# Patient Record
Sex: MALE | Race: BLACK OR AFRICAN AMERICAN | Employment: FULL TIME | URBAN - METROPOLITAN AREA
[De-identification: names, ages, dates, MRNs, and addresses within clinical notes are randomized per-mention and may not be internally consistent; named-entity substitution may affect disease eponyms.]

---

## 2019-06-04 ENCOUNTER — HOSPITAL ENCOUNTER (EMERGENCY)
Age: 39
Discharge: HOME | End: 2019-06-04
Attending: EMERGENCY MEDICINE

## 2019-06-04 VITALS
BODY MASS INDEX: 25.18 KG/M2 | HEART RATE: 53 BPM | RESPIRATION RATE: 20 BRPM | HEIGHT: 69 IN | OXYGEN SATURATION: 100 % | TEMPERATURE: 97.5 F | WEIGHT: 170 LBS | SYSTOLIC BLOOD PRESSURE: 157 MMHG | DIASTOLIC BLOOD PRESSURE: 85 MMHG

## 2019-06-04 DIAGNOSIS — K02.9 DENTAL CARIES: Primary | ICD-10-CM

## 2019-06-04 PROCEDURE — 6370000000 HC RX 637 (ALT 250 FOR IP): Performed by: EMERGENCY MEDICINE

## 2019-06-04 PROCEDURE — 99282 EMERGENCY DEPT VISIT SF MDM: CPT

## 2019-06-04 RX ORDER — PENICILLIN V POTASSIUM 250 MG/1
500 TABLET ORAL ONCE
Status: COMPLETED | OUTPATIENT
Start: 2019-06-04 | End: 2019-06-04

## 2019-06-04 RX ORDER — IBUPROFEN 800 MG/1
800 TABLET ORAL ONCE
Status: COMPLETED | OUTPATIENT
Start: 2019-06-04 | End: 2019-06-04

## 2019-06-04 RX ORDER — IBUPROFEN 800 MG/1
800 TABLET ORAL EVERY 8 HOURS PRN
Qty: 21 TABLET | Refills: 0 | Status: SHIPPED | OUTPATIENT
Start: 2019-06-04 | End: 2022-02-28

## 2019-06-04 RX ORDER — PENICILLIN V POTASSIUM 500 MG/1
500 TABLET ORAL 4 TIMES DAILY
Qty: 28 TABLET | Refills: 0 | Status: SHIPPED | OUTPATIENT
Start: 2019-06-04 | End: 2019-06-11

## 2019-06-04 RX ADMIN — PENICILLIN V POTASSIUM 500 MG: 250 TABLET ORAL at 00:39

## 2019-06-04 RX ADMIN — IBUPROFEN 800 MG: 800 TABLET, FILM COATED ORAL at 00:39

## 2019-06-04 ASSESSMENT — ENCOUNTER SYMPTOMS
EYE PAIN: 0
RHINORRHEA: 0
SORE THROAT: 0
NAUSEA: 0
VOMITING: 0
ABDOMINAL PAIN: 0
SHORTNESS OF BREATH: 0
COLOR CHANGE: 0
COUGH: 0

## 2019-06-04 ASSESSMENT — PAIN SCALES - GENERAL
PAINLEVEL_OUTOF10: 8
PAINLEVEL_OUTOF10: 7

## 2019-06-04 ASSESSMENT — PAIN DESCRIPTION - PROGRESSION: CLINICAL_PROGRESSION: GRADUALLY WORSENING

## 2019-06-04 ASSESSMENT — PAIN DESCRIPTION - ONSET: ONSET: PROGRESSIVE

## 2019-06-04 ASSESSMENT — PAIN DESCRIPTION - LOCATION: LOCATION: TEETH

## 2019-06-04 ASSESSMENT — PAIN DESCRIPTION - FREQUENCY: FREQUENCY: CONTINUOUS

## 2019-06-04 ASSESSMENT — PAIN DESCRIPTION - DESCRIPTORS: DESCRIPTORS: ACHING

## 2019-06-04 ASSESSMENT — PAIN DESCRIPTION - PAIN TYPE: TYPE: ACUTE PAIN

## 2019-06-04 NOTE — ED PROVIDER NOTES
[facility]  ED  Emergency Department Encounter  EmergencyMedicine Resident     Pt Name:Mohsen Elena  MRN: [id number]  Birthdate 1980  Date of evaluation: 6/4/19  PCP:  No primary care provider on file. CHIEF COMPLAINT       Chief Complaint   Patient presents with    Dental Pain       HISTORY OF PRESENT ILLNESS  (Location/Symptom, Timing/Onset, Context/Setting, Quality, Duration, Modifying Factors, Severity.)      Mandie Miles is a 37 y.o. male who presents with chief complaint of dental pain. States the pain is been ongoing for the past few days. Denies any trauma to his head. Has had teeth pulled out in the past.  Denies any throat pain or difficulty with swallowing. Denies any fever, chills, nausea or vomiting. Denies any other constitutional complaints. PAST MEDICAL / SURGICAL / SOCIAL / FAMILY HISTORY      has no past medical history on file. has no past surgical history on file.     Social History     Socioeconomic History    Marital status: Single     Spouse name: Not on file    Number of children: Not on file    Years of education: Not on file    Highest education level: Not on file   Occupational History    Not on file   Social Needs    Financial resource strain: Not on file    Food insecurity:     Worry: Not on file     Inability: Not on file    Transportation needs:     Medical: Not on file     Non-medical: Not on file   Tobacco Use    Smoking status: Current Every Day Smoker     Types: Cigarettes    Smokeless tobacco: Current User   Substance and Sexual Activity    Alcohol use: Not on file    Drug use: Yes     Types: Marijuana    Sexual activity: Not on file   Lifestyle    Physical activity:     Days per week: Not on file     Minutes per session: Not on file    Stress: Not on file   Relationships    Social connections:     Talks on phone: Not on file     Gets together: Not on file     Attends Nondenominational service: Not on file     Active member of club or organization: Not on file     Attends meetings of clubs or organizations: Not on file     Relationship status: Not on file    Intimate partner violence:     Fear of current or ex partner: Not on file     Emotionally abused: Not on file     Physically abused: Not on file     Forced sexual activity: Not on file   Other Topics Concern    Not on file   Social History Narrative    Not on file       History reviewed. No pertinent family history. Allergies:  Patient has no known allergies. Home Medications:  Prior to Admission medications    Medication Sig Start Date End Date Taking? Authorizing Provider   penicillin v potassium (VEETID) 500 MG tablet Take 1 tablet by mouth 4 times daily for 7 days 6/4/19 6/11/19 Yes Jillian Bill MD   ibuprofen (IBU) 800 MG tablet Take 1 tablet by mouth every 8 hours as needed for Pain 6/4/19  Yes Jordan Valle MD       REVIEW OF SYSTEMS    (2-9 systems for level 4, 10 or more for level 5)      Review of Systems   Constitutional: Negative for chills and fever. HENT: Positive for dental problem. Negative for rhinorrhea and sore throat. Eyes: Negative for pain and visual disturbance. Respiratory: Negative for cough and shortness of breath. Cardiovascular: Negative for chest pain and palpitations. Gastrointestinal: Negative for abdominal pain, nausea and vomiting. Genitourinary: Negative for difficulty urinating and dysuria. Musculoskeletal: Negative for arthralgias and myalgias. Skin: Negative for color change and wound. Neurological: Negative for weakness, numbness and headaches. Psychiatric/Behavioral: Negative for behavioral problems and dysphoric mood.        PHYSICAL EXAM   (up to 7 for level 4, 8 or more for level 5)      INITIAL VITALS:   BP (!) 157/85   Pulse 53   Temp 97.5 °F (36.4 °C) (Oral)   Resp 20   Ht 5' 9\" (1.753 m)   Wt 170 lb (77.1 kg)   SpO2 100%   BMI 25.10 kg/m²     Physical Exam   Constitutional: He is oriented to follow-up with dentist.    Sigifredo Carcamo:  None    EKG  None    All EKG's are interpreted by the Emergency Department Physician who either signs or Co-signs this chart in the absence of a cardiologist.    EMERGENCY DEPARTMENT COURSE:  Patient advised to take medications as prescribed. Provided with a dental clinic list.  Discussed if symptoms worsen, to return to the ED. Patient agreeable with the plan, stable for discharge. PROCEDURES:  None    CONSULTS:  None    CRITICAL CARE:  None    FINAL IMPRESSION      1. Dental caries          DISPOSITION / PLAN     DISPOSITION Decision To Discharge 06/04/2019 12:32:31 AM      PATIENT REFERRED TO:  No follow-up provider specified.     DISCHARGE MEDICATIONS:  New Prescriptions    IBUPROFEN (IBU) 800 MG TABLET    Take 1 tablet by mouth every 8 hours as needed for Pain    PENICILLIN V POTASSIUM (VEETID) 500 MG TABLET    Take 1 tablet by mouth 4 times daily for 7 days       Adithya Chávez MD  Emergency Medicine Resident    (Please note that portions of thisnote were completed with a voice recognition program.  Efforts were made to edit the dictations but occasionally words are mis-transcribed.)       Devyn Roldan MD  Resident  06/04/19 [id number]

## 2019-06-04 NOTE — ED PROVIDER NOTES
[facility]     Emergency Department     Faculty Attestation    I performed a history and physical examination of the patient and discussed management with the resident. I reviewed the residents note and agree with the documented findings and plan of care. Any areas of disagreement are noted on the chart. I was personally present for the key portions of any procedures. I have documented in the chart those procedures where I was not present during the key portions. I have reviewed the emergency nurses triage note. I agree with the chief complaint, past medical history, past surgical history, allergies, medications, social and family history as documented unless otherwise noted below. Documentation of the HPI, Physical Exam and Medical Decision Making performed by medical students or scribes is based on my personal performance of the HPI, PE and MDM. For Physician Assistant/ Nurse Practitioner cases/documentation I have personally evaluated this patient and have completed at least one if not all key elements of the E/M (history, physical exam, and MDM). Additional findings are as noted. Primary Care Physician: No primary care provider on file. History: This is a 37 y.o. male who presents to the Emergency Department with complaint of Dental pain. Is been ongoing for last 3 days. Patient denies any fever, chills or sweats. The patient denies any difficulty swallowing or shortness of breath    Physical:   height is 5' 9\" (1.753 m) and weight is 170 lb (77.1 kg). His oral temperature is 97.5 °F (36.4 °C). His blood pressure is 157/85 (abnormal) and his pulse is 53. His respiration is 20 and oxygen saturation is 100%. The patient has multiple dental caries noted. There is no tongue elevation noted. The patient has no abscess. Airways patent there is no pooling of oral secretions.      Impression: Dental caries    Plan: Antibiotics, analgesia and dentist follow-up    Terence Alejo MD  Attending Emergency  Physician        Tony Watkins MD  06/04/19 [id number]

## 2019-07-16 ENCOUNTER — HOSPITAL ENCOUNTER (EMERGENCY)
Age: 39
Discharge: HOME | End: 2019-07-16
Attending: EMERGENCY MEDICINE

## 2019-07-16 VITALS
TEMPERATURE: 97.7 F | SYSTOLIC BLOOD PRESSURE: 144 MMHG | DIASTOLIC BLOOD PRESSURE: 82 MMHG | HEART RATE: 59 BPM | OXYGEN SATURATION: 100 % | RESPIRATION RATE: 18 BRPM

## 2019-07-16 DIAGNOSIS — R30.0 DYSURIA: Primary | ICD-10-CM

## 2019-07-16 PROCEDURE — 6360000002 HC RX W HCPCS: Performed by: PHYSICIAN ASSISTANT

## 2019-07-16 PROCEDURE — 96372 THER/PROPH/DIAG INJ SC/IM: CPT

## 2019-07-16 PROCEDURE — 87491 CHLMYD TRACH DNA AMP PROBE: CPT

## 2019-07-16 PROCEDURE — 99283 EMERGENCY DEPT VISIT LOW MDM: CPT

## 2019-07-16 PROCEDURE — 6370000000 HC RX 637 (ALT 250 FOR IP): Performed by: PHYSICIAN ASSISTANT

## 2019-07-16 PROCEDURE — 87591 N.GONORRHOEAE DNA AMP PROB: CPT

## 2019-07-16 RX ORDER — CEFTRIAXONE SODIUM 250 MG/1
250 INJECTION, POWDER, FOR SOLUTION INTRAMUSCULAR; INTRAVENOUS ONCE
Status: COMPLETED | OUTPATIENT
Start: 2019-07-16 | End: 2019-07-16

## 2019-07-16 RX ORDER — AZITHROMYCIN 250 MG/1
1000 TABLET, FILM COATED ORAL ONCE
Status: COMPLETED | OUTPATIENT
Start: 2019-07-16 | End: 2019-07-16

## 2019-07-16 RX ADMIN — CEFTRIAXONE SODIUM 250 MG: 250 INJECTION, POWDER, FOR SOLUTION INTRAMUSCULAR; INTRAVENOUS at 11:55

## 2019-07-16 RX ADMIN — AZITHROMYCIN 1000 MG: 250 TABLET, FILM COATED ORAL at 11:55

## 2019-07-16 ASSESSMENT — ENCOUNTER SYMPTOMS
WHEEZING: 0
COUGH: 0
VOMITING: 0
NAUSEA: 0
ABDOMINAL PAIN: 0
COLOR CHANGE: 0

## 2019-07-17 LAB
C. TRACHOMATIS DNA ,URINE: NEGATIVE
N. GONORRHOEAE DNA, URINE: NEGATIVE
SPECIMEN DESCRIPTION: NORMAL

## 2019-07-28 ENCOUNTER — HOSPITAL ENCOUNTER (EMERGENCY)
Age: 39
Discharge: HOME | End: 2019-07-28
Attending: EMERGENCY MEDICINE

## 2019-07-28 ENCOUNTER — APPOINTMENT (OUTPATIENT)
Dept: GENERAL RADIOLOGY | Age: 39
End: 2019-07-28

## 2019-07-28 VITALS
BODY MASS INDEX: 23.63 KG/M2 | HEART RATE: 64 BPM | DIASTOLIC BLOOD PRESSURE: 83 MMHG | TEMPERATURE: 98.1 F | OXYGEN SATURATION: 98 % | SYSTOLIC BLOOD PRESSURE: 146 MMHG | WEIGHT: 160 LBS | RESPIRATION RATE: 18 BRPM

## 2019-07-28 DIAGNOSIS — M70.21 OLECRANON BURSITIS OF RIGHT ELBOW: Primary | ICD-10-CM

## 2019-07-28 PROCEDURE — 73080 X-RAY EXAM OF ELBOW: CPT

## 2019-07-28 PROCEDURE — 6360000002 HC RX W HCPCS: Performed by: GENERAL PRACTICE

## 2019-07-28 PROCEDURE — 90715 TDAP VACCINE 7 YRS/> IM: CPT | Performed by: GENERAL PRACTICE

## 2019-07-28 PROCEDURE — 99283 EMERGENCY DEPT VISIT LOW MDM: CPT

## 2019-07-28 PROCEDURE — 90471 IMMUNIZATION ADMIN: CPT | Performed by: GENERAL PRACTICE

## 2019-07-28 RX ADMIN — TETANUS TOXOID, REDUCED DIPHTHERIA TOXOID AND ACELLULAR PERTUSSIS VACCINE, ADSORBED 0.5 ML: 5; 2.5; 8; 8; 2.5 SUSPENSION INTRAMUSCULAR at 15:53

## 2019-07-28 ASSESSMENT — PAIN DESCRIPTION - ORIENTATION: ORIENTATION: RIGHT

## 2019-07-28 ASSESSMENT — ENCOUNTER SYMPTOMS
SHORTNESS OF BREATH: 0
ABDOMINAL PAIN: 0
NAUSEA: 0
VOMITING: 0
COUGH: 0

## 2019-07-28 ASSESSMENT — PAIN DESCRIPTION - LOCATION: LOCATION: ELBOW

## 2019-07-28 ASSESSMENT — PAIN DESCRIPTION - PAIN TYPE: TYPE: ACUTE PAIN

## 2019-07-28 ASSESSMENT — PAIN DESCRIPTION - DESCRIPTORS: DESCRIPTORS: ACHING

## 2019-07-28 ASSESSMENT — PAIN SCALES - GENERAL: PAINLEVEL_OUTOF10: 8

## 2019-07-28 NOTE — ED NOTES
Pt presents to the ER with complaints of right elbow pain from falling into a hole at work. He denies any LOC. Pt has 2 small lacerations on his right lower leg. He states he is having some mild elbow pain. VSS.       Lesli Kuo RN  07/28/19 [id number]

## 2021-05-16 ENCOUNTER — HOSPITAL ENCOUNTER (EMERGENCY)
Age: 41
Discharge: HOME | End: 2021-05-16
Attending: EMERGENCY MEDICINE
Payer: COMMERCIAL

## 2021-05-16 VITALS
TEMPERATURE: 97 F | OXYGEN SATURATION: 100 % | DIASTOLIC BLOOD PRESSURE: 71 MMHG | HEIGHT: 69 IN | BODY MASS INDEX: 23.7 KG/M2 | HEART RATE: 63 BPM | RESPIRATION RATE: 16 BRPM | WEIGHT: 160 LBS | SYSTOLIC BLOOD PRESSURE: 114 MMHG

## 2021-05-16 DIAGNOSIS — M79.631 PAIN AND SWELLING OF RIGHT FOREARM: Primary | ICD-10-CM

## 2021-05-16 DIAGNOSIS — M79.89 PAIN AND SWELLING OF RIGHT FOREARM: Primary | ICD-10-CM

## 2021-05-16 PROCEDURE — 99282 EMERGENCY DEPT VISIT SF MDM: CPT

## 2021-05-16 ASSESSMENT — ENCOUNTER SYMPTOMS
BACK PAIN: 0
FACIAL SWELLING: 0
SHORTNESS OF BREATH: 0
ABDOMINAL PAIN: 0

## 2021-05-16 NOTE — ED PROVIDER NOTES
[facility]  ED  Emergency Department Encounter  Emergency Medicine Resident     Pt Name: Ayla Flaherty  MRN: [id number]  [place] 1980  Date of evaluation: 5/16/21  PCP:  No primary care provider on file. CHIEF COMPLAINT       Chief Complaint   Patient presents with    Arm Swelling     right arm had laceration repair on tuesday, pt reports he is taking antibiotics but now right arm is swollen       HISTORY OFPRESENT ILLNESS  (Location/Symptom, Timing/Onset, Context/Setting, Quality, Duration, Modifying Factors,Severity.)      Ayla Flaherty is a 35 y. o.yo male who presents with R arm swelling. Patient here with right arm swelling, was seen in the emergency department a week ago for laceration, laceration was sustained with injury by a chainsaw, was sutured here and seen by trauma and discharged home with Keflex, states that he has not noticed any worsening pain but today there is some residual swelling distally to the right arm, no neurovascular compromise, range of motion intact, there is mild swelling on the forearm but patient states no fevers no chills, no redness no drainage or or purulent material seen at the site of the laceration. Denies any recurrent trauma or reinjury of that arm. Denies any weakness in  or loss of strength. PAST MEDICAL / SURGICAL / SOCIAL / FAMILY HISTORY      has no past medical history on file. has no past surgical history on file.      Social History     Socioeconomic History    Marital status: Single     Spouse name: Not on file    Number of children: Not on file    Years of education: Not on file    Highest education level: Not on file   Occupational History    Not on file   Tobacco Use    Smoking status: Current Every Day Smoker     Types: Cigarettes    Smokeless tobacco: Current User   Substance and Sexual Activity    Alcohol use: Not on file    Drug use: Yes     Types: Marijuana    Sexual activity: Not on file   Other Topics Concern    Not on file   Social History Narrative    Not on file     Social Determinants of Health     Financial Resource Strain:     Difficulty of Paying Living Expenses:    Food Insecurity:     Worried About Running Out of Food in the Last Year:     [address] in the Last Year:    Transportation Needs:     Lack of Transportation (Medical):  Lack of Transportation (Non-Medical):    Physical Activity:     Days of Exercise per Week:     Minutes of Exercise per Session:    Stress:     Feeling of Stress :    Social Connections:     Frequency of Communication with Friends and Family:     Frequency of Social Gatherings with Friends and Family:     Attends Hindu Services:     Active Member of Clubs or Organizations:     Attends Club or Organization Meetings:     Marital Status:    Intimate Partner Violence:     Fear of Current or Ex-Partner:     Emotionally Abused:     Physically Abused:     Sexually Abused:        No family history on file. Allergies:  Patient has no known allergies. Home Medications:  Prior to Admission medications    Medication Sig Start Date End Date Taking? Authorizing Provider   METFORMIN HCL PO Take by mouth    Historical Provider, MD   ibuprofen (IBU) 800 MG tablet Take 1 tablet by mouth every 8 hours as needed for Pain 6/4/19   Ashley Anderson MD       REVIEW OFSYSTEMS    (2-9 systems for level 4, 10 or more for level 5)      Review of Systems   Constitutional: Negative for fever. HENT: Negative for facial swelling. Eyes: Negative for visual disturbance. Respiratory: Negative for shortness of breath. Cardiovascular: Negative for chest pain. Gastrointestinal: Negative for abdominal pain. Genitourinary: Negative for flank pain. Musculoskeletal: Negative for back pain. Skin: Positive for wound. Neurological: Negative for headaches. Psychiatric/Behavioral: Negative for confusion.        PHYSICAL EXAM   (up to 7 for level 4, 8 or more forlevel 5)      ED TRIAGE VITALS BP: 114/71, Temp: 97 °F (36.1 °C), Pulse: 63, Resp: 16, SpO2: 100 %    Vitals:    05/16/21 1140   BP: 114/71   Pulse: 63   Resp: 16   Temp: 97 °F (36.1 °C)   TempSrc: Tympanic   SpO2: 100%   Weight: 160 lb (72.6 kg)   Height: 5' 9\" (1.753 m)       Physical Exam  HENT:      Head: Normocephalic. Nose: Nose normal.   Eyes:      Extraocular Movements: Extraocular movements intact. Cardiovascular:      Rate and Rhythm: Normal rate. Musculoskeletal:         General: Swelling present. Right forearm: Swelling present. Arms:       Cervical back: Normal range of motion. Neurological:      Mental Status: He is alert. DIFFERENTIAL  DIAGNOSIS     PLAN (LABS / IMAGING / EKG):  No orders of the defined types were placed in this encounter. MEDICATIONS ORDERED:  No orders of the defined types were placed in this encounter. DDX:     Swelling of the right arm, infection, compartment syndrome,    Initial MDM/Plan: 35 y.o. male who presents with swelling R arm. Swelling-compartment/infection: On exam there is mild swelling, no neurovascular compromise, no pain with passive movements, no fevers or chills, area of laceration appears well, intact clean dry-has an appointment to follow-up with surgeon tomorrow. DIAGNOSTIC RESULTS / EMERGENCYDEPARTMENT COURSE / MDM     LABS:  No results found for this visit on 05/16/21. RADIOLOGY:  No orders to display         EMERGENCY DEPARTMENT COURSE:  ED Course as of May 16 1226   Sun May 16, 2021   1154 Patient seen and assessed in the emergency department no acute respiratory or cardiovascular distress.   Patient here with right arm swelling, was seen in the emergency department a week ago for laceration, laceration was sustained with injury by a chainsaw, was sutured here and seen by trauma and discharged home with Keflex, states that he has not noticed any worsening pain but today there is some residual swelling distally to the right arm, no neurovascular compromise, range of motion intact, there is mild swelling on the forearm but patient states no fevers no chills, no redness no drainage or or purulent material seen at the site of the laceration. Denies any recurrent trauma or reinjury of that arm. Denies any weakness in  or loss of strength. [PS]      ED Course User Index  [PS] Lexus Murdock MD          PROCEDURES:  None    CONSULTS:  None    CRITICAL CARE:  Please see attending note    FINAL IMPRESSION      1.  Pain and swelling of right forearm          DISPOSITION / PLAN     DISPOSITION Decision To Discharge 05/16/2021 11:55:39 AM       PATIENT REFERRED TO:  [facility]  [address] [facility]  [phone]  In 1 day      [facility]  [address] [address]  [phone]    As needed, If symptoms worsen      DISCHARGE MEDICATIONS:  New Prescriptions    No medications on file       Lexus Murdock MD  Emergency Medicine Resident    (Please note that portions of this note were completed with a voice recognition program.Efforts were made to edit the dictations but occasionally words are mis-transcribed.)       Lexus Murdock MD  Resident  05/16/21 9347

## 2021-05-16 NOTE — ED PROVIDER NOTES
[facility]     Emergency Department     Faculty Note/ Attestation      Pt Name: Daja Marks                                       MRN: [id number]  [place] 1980  Date of evaluation: 5/16/2021    Patients PCP:    No primary care provider on file. Attestation  I performed a history and physical examination of the patient and discussed management with the resident. I reviewed the residents note and agree with the documented findings and plan of care. Any areas of disagreement are noted on the chart. I was personally present for the key portions of any procedures. I have documented in the chart those procedures where I was not present during the key portions. I have reviewed the emergency nurses triage note. I agree with the chief complaint, past medical history, past surgical history, allergies, medications, social and family history as documented unless otherwise noted below. For Physician Assistant/ Nurse Practitioner cases/documentation I have personally evaluated this patient and have completed at least one if not all key elements of the E/M (history, physical exam, and MDM). Additional findings are as noted. Initial Screens:             Vitals:    Vitals:    05/16/21 1140   BP: 114/71   Pulse: 63   Resp: 16   Temp: 97 °F (36.1 °C)   TempSrc: Tympanic   SpO2: 100%   Weight: 160 lb (72.6 kg)   Height: 5' 9\" (1.753 m)       CHIEF COMPLAINT       Chief Complaint   Patient presents with    Arm Swelling     right arm had laceration repair on tuesday, pt reports he is taking antibiotics but now right arm is swollen       The pt is a 34 YO M       EMERGENCY DEPARTMENT COURSE:     -------------------------  BP: 114/71, Temp: 97 °F (36.1 °C), Pulse: 63, Resp: 16  Physical Exam  Constitutional:       Appearance: He is well-developed. He is not diaphoretic. HENT:      Head: Normocephalic and atraumatic.       Right Ear: External ear normal.      Left Ear: External ear normal. Eyes:      General: No scleral icterus. Right eye: No discharge. Left eye: No discharge. Neck:      Trachea: No tracheal deviation. Pulmonary:      Effort: Pulmonary effort is normal. No respiratory distress. Breath sounds: No stridor. Musculoskeletal:         General: Normal range of motion. Cervical back: Normal range of motion. Comments: Healing wound to the right elbow region no passive pain on range of motion   Skin:     General: Skin is warm and dry. Neurological:      Mental Status: He is alert and oriented to person, place, and time. Coordination: Coordination normal.   Psychiatric:         Behavior: Behavior normal.           Comments  No signs of infection patient has good follow-up for tomorrow can be safely discharged    ED Course as of May 16 1239   Sun May 16, 2021   1154 Patient seen and assessed in the emergency department no acute respiratory or cardiovascular distress. Patient here with right arm swelling, was seen in the emergency department a week ago for laceration, laceration was sustained with injury by a chainsaw, was sutured here and seen by trauma and discharged home with Keflex, states that he has not noticed any worsening pain but today there is some residual swelling distally to the right arm, no neurovascular compromise, range of motion intact, there is mild swelling on the forearm but patient states no fevers no chills, no redness no drainage or or purulent material seen at the site of the laceration. Denies any recurrent trauma or reinjury of that arm. Denies any weakness in  or loss of strength. [PS]      ED Course User Index  [PS] MD Amelia Garcia DO,, DO, RDMS.   Attending Emergency Physician         Amelia Goins DO  05/16/21 1528

## 2021-10-25 ENCOUNTER — HOSPITAL ENCOUNTER (EMERGENCY)
Age: 41
Discharge: HOME | End: 2021-10-25
Attending: EMERGENCY MEDICINE
Payer: COMMERCIAL

## 2021-10-25 VITALS
HEIGHT: 69 IN | OXYGEN SATURATION: 100 % | DIASTOLIC BLOOD PRESSURE: 91 MMHG | RESPIRATION RATE: 18 BRPM | SYSTOLIC BLOOD PRESSURE: 136 MMHG | TEMPERATURE: 97.2 F | BODY MASS INDEX: 24.44 KG/M2 | WEIGHT: 165 LBS | HEART RATE: 88 BPM

## 2021-10-25 DIAGNOSIS — N34.2 URETHRITIS: Primary | ICD-10-CM

## 2021-10-25 PROCEDURE — 6360000002 HC RX W HCPCS: Performed by: STUDENT IN AN ORGANIZED HEALTH CARE EDUCATION/TRAINING PROGRAM

## 2021-10-25 PROCEDURE — 99283 EMERGENCY DEPT VISIT LOW MDM: CPT

## 2021-10-25 PROCEDURE — 6370000000 HC RX 637 (ALT 250 FOR IP): Performed by: STUDENT IN AN ORGANIZED HEALTH CARE EDUCATION/TRAINING PROGRAM

## 2021-10-25 PROCEDURE — 87491 CHLMYD TRACH DNA AMP PROBE: CPT

## 2021-10-25 PROCEDURE — 96372 THER/PROPH/DIAG INJ SC/IM: CPT

## 2021-10-25 PROCEDURE — 87591 N.GONORRHOEAE DNA AMP PROB: CPT

## 2021-10-25 RX ORDER — DOXYCYCLINE HYCLATE 100 MG
100 TABLET ORAL ONCE
Status: COMPLETED | OUTPATIENT
Start: 2021-10-25 | End: 2021-10-25

## 2021-10-25 RX ORDER — CEFTRIAXONE SODIUM 250 MG/1
250 INJECTION, POWDER, FOR SOLUTION INTRAMUSCULAR; INTRAVENOUS ONCE
Status: DISCONTINUED | OUTPATIENT
Start: 2021-10-25 | End: 2021-10-25

## 2021-10-25 RX ORDER — DOXYCYCLINE HYCLATE 100 MG
100 TABLET ORAL 2 TIMES DAILY
Qty: 13 TABLET | Refills: 0 | Status: SHIPPED | OUTPATIENT
Start: 2021-10-25 | End: 2021-11-01

## 2021-10-25 RX ORDER — CEFTRIAXONE 500 MG/1
500 INJECTION, POWDER, FOR SOLUTION INTRAMUSCULAR; INTRAVENOUS ONCE
Status: COMPLETED | OUTPATIENT
Start: 2021-10-25 | End: 2021-10-25

## 2021-10-25 RX ADMIN — CEFTRIAXONE SODIUM 500 MG: 500 INJECTION, POWDER, FOR SOLUTION INTRAMUSCULAR; INTRAVENOUS at 19:47

## 2021-10-25 RX ADMIN — DOXYCYCLINE HYCLATE 100 MG: 100 TABLET, COATED ORAL at 19:46

## 2021-10-25 ASSESSMENT — ENCOUNTER SYMPTOMS
DIARRHEA: 0
SHORTNESS OF BREATH: 0
NAUSEA: 0
ABDOMINAL PAIN: 0
VOMITING: 0

## 2021-10-25 NOTE — ED PROVIDER NOTES
[facility]  ED  Emergency Department Encounter  EmergencyMedicine Resident     Pt Name:Mohsen Guerrero  MRN: [id number]  Birthdate 1980  Date of evaluation: 10/25/21  PCP:  No primary care provider on file. This patient was evaluated in the Emergency Department for symptoms described in the history of present illness. The patient was evaluated in the context of the global COVID-19 pandemic, which necessitated consideration that the patient might be at risk for infection with the SARS-CoV-2 virus that causes COVID-19. Institutional protocols and algorithms that pertain to the evaluation of patients at risk for COVID-19 are in a state of rapid change based on information released by regulatory bodies including the CDC and federal and state organizations. These policies and algorithms were followed during the patient's care in the ED. CHIEF COMPLAINT       Chief Complaint   Patient presents with    Exposure to STD    Penile Discharge       HISTORY OF PRESENT ILLNESS  (Location/Symptom, Timing/Onset, Context/Setting, Quality, Duration, Modifying Factors, Severity.)      Maykel Valenzuela is a 35 y.o. male who presents with complaint of exposure to STD as well as penile discharge. Noticed purulent discharge from his penis this morning. Has had dysuria for the past couple days. Has had unprotected sex with partner. States he only has 1 partner is unsure of what his partner is doing. Denies any hematuria, back pain, abdominal pain, nausea, vomiting, diarrhea, fever, chills, chest pain, shortness of breath. Is a diabetic with long-acting insulin as well as Metformin. States that sugars are well controlled. PAST MEDICAL / SURGICAL / SOCIAL / FAMILY HISTORY      has no past medical history on file. has no past surgical history on file.       Social History     Socioeconomic History    Marital status: Single     Spouse name: Not on file    Number of children: Not on file    Years of education: Not on file    Highest education level: Not on file   Occupational History    Not on file   Tobacco Use    Smoking status: Current Every Day Smoker     Types: Cigarettes    Smokeless tobacco: Current User   Substance and Sexual Activity    Alcohol use: Not on file    Drug use: Yes     Types: Marijuana    Sexual activity: Not on file   Other Topics Concern    Not on file   Social History Narrative    Not on file     Social Determinants of Health     Financial Resource Strain:     Difficulty of Paying Living Expenses:    Food Insecurity:     Worried About Running Out of Food in the Last Year:     [address] in the Last Year:    Transportation Needs:     Lack of Transportation (Medical):  Lack of Transportation (Non-Medical):    Physical Activity:     Days of Exercise per Week:     Minutes of Exercise per Session:    Stress:     Feeling of Stress :    Social Connections:     Frequency of Communication with Friends and Family:     Frequency of Social Gatherings with Friends and Family:     Attends [employer] Services:     Active Member of Clubs or Organizations:     Attends Club or Organization Meetings:     Marital Status:    Intimate Partner Violence:     Fear of Current or Ex-Partner:     Emotionally Abused:     Physically Abused:     Sexually Abused:        No family history on file. Allergies:  Patient has no known allergies. Home Medications:  Prior to Admission medications    Medication Sig Start Date End Date Taking?  Authorizing Provider   doxycycline hyclate (VIBRA-TABS) 100 MG tablet Take 1 tablet by mouth 2 times daily for 7 days 10/25/21 11/1/21 Yes Valora Mates, DO   METFORMIN HCL PO Take by mouth    Historical Provider, MD   ibuprofen (IBU) 800 MG tablet Take 1 tablet by mouth every 8 hours as needed for Pain 6/4/19   Juli Herrera MD       REVIEW OF SYSTEMS    (2-9 systems for level 4, 10 or more for level 5)      Review of Systems Constitutional: Negative for chills, fatigue and fever. Respiratory: Negative for shortness of breath. Cardiovascular: Negative for chest pain. Gastrointestinal: Negative for abdominal pain, diarrhea, nausea and vomiting. Genitourinary: Positive for discharge and dysuria. Negative for penile swelling, scrotal swelling, testicular pain and urgency. PHYSICAL EXAM   (up to 7 for level 4, 8 or more for level 5)      INITIAL VITALS:   BP (!) 136/91   Pulse 88   Temp 97.2 °F (36.2 °C) (Oral)   Resp 18   Ht 5' 9\" (1.753 m)   Wt 165 lb (74.8 kg)   SpO2 100%   BMI 24.37 kg/m²     Physical Exam  Constitutional:       General: He is not in acute distress. Appearance: He is not ill-appearing, toxic-appearing or diaphoretic. Comments: Well-appearing 70-year-old male, speaking full senses, nontoxic-appearing, none ill-appearing, no acute distress   Cardiovascular:      Rate and Rhythm: Normal rate and regular rhythm. Heart sounds: No murmur heard. No friction rub. No gallop. Pulmonary:      Effort: No respiratory distress. Breath sounds: No wheezing, rhonchi or rales. Chest:      Chest wall: No tenderness. Abdominal:      General: There is no distension. Tenderness: There is no abdominal tenderness. There is no right CVA tenderness, left CVA tenderness, guarding or rebound. Genitourinary:     Comments: Purulent penile discharge noted at the urethral meatus, penis soft nontender palpation with wartlike lesion that is raised proximally 0.5 cm. Testicles are nontender to palpation, no testicular lesions noted, no inguinal hernias noted. Skin:     General: Skin is warm and dry. Capillary Refill: Capillary refill takes less than 2 seconds. Neurological:      Mental Status: He is oriented to person, place, and time.          DIFFERENTIAL  DIAGNOSIS     PLAN (LABS / IMAGING / EKG):  Orders Placed This Encounter   Procedures    C.trachomatis N.gonorrhoeae DNA, Urine  PREVIOUS SPECIMEN    Urinalysis with Microscopic       MEDICATIONS ORDERED:  Orders Placed This Encounter   Medications    DISCONTD: cefTRIAXone (ROCEPHIN) injection 250 mg     Order Specific Question:   Antimicrobial Indications     Answer:   STD infection    doxycycline hyclate (VIBRA-TABS) tablet 100 mg     Order Specific Question:   Antimicrobial Indications     Answer:   STD infection    cefTRIAXone (ROCEPHIN) injection 500 mg     Order Specific Question:   Antimicrobial Indications     Answer:   STD infection    doxycycline hyclate (VIBRA-TABS) 100 MG tablet     Sig: Take 1 tablet by mouth 2 times daily for 7 days     Dispense:  13 tablet     Refill:  0       DDX: UTI, urethritis, pyelonephritis, epididymitis, torsion    DIAGNOSTIC RESULTS / EMERGENCY DEPARTMENT COURSE / MDM   LAB RESULTS:  No results found for this visit on 10/25/21. IMPRESSION: Urine sent for gonorrhea/chlamydia. Lab error resulted in urinalysis taking too long, spoke with patient about staying versus going. He chose to go as he is already being treated with antibiotics    EMERGENCY DEPARTMENT COURSE:  Well-appearing 24-year-old male presenting chief complaint of exposure to STD as well as penile discharge. Examination does show purulent drainage from the urethral meatus. Will treat empirically. No CVA tenderness as well as no testicular tenderness unlikely to be epididymitis, torsion or pyelonephritis. Will test for gonorrhea as well as chlamydia. Will obtain urinalysis and discharge home. Patient discharged home with doxycycline. Given strict return precautions as well as PCP follow-up. Patient was amenable to discharge. PROCEDURES:  n/a    CONSULTS:  None    CRITICAL CARE:  n/a    FINAL IMPRESSION      1. Urethritis          DISPOSITION / PLAN     DISPOSITION Decision To Discharge 10/25/2021 08:58:45 PM      PATIENT REFERRED TO:  No follow-up provider specified.     DISCHARGE MEDICATIONS:  Discharge Medication List as of 10/25/2021  9:01 PM      START taking these medications    Details   doxycycline hyclate (VIBRA-TABS) 100 MG tablet Take 1 tablet by mouth 2 times daily for 7 days, Disp-13 tablet, R-0Print             Rock Sophia DO  Emergency Medicine Resident    (Please note that portions of thisnote were completed with a voice recognition program.  Efforts were made to edit the dictations but occasionally words are mis-transcribed.)       Frankie Smiley DO  Resident  10/25/21 [id number]

## 2021-10-25 NOTE — ED PROVIDER NOTES
[facility] ED  Faculty Attestation    I performed a history and physical examination of the patient and discussed management with the resident. I reviewed the residents note and agree with the documented findings and plan of care. Any areas of disagreement are noted on the chart. I was personally present for the key portions of any procedures. I have documented in the chart those procedures where I was not present during the key portions. I have reviewed the emergency nurses triage note. I agree with the chief complaint, past medical history, past surgical history, allergies, medications, social, and family history as documented unless otherwise noted below.        This is a 78-year-old male who presents to the emergency department for evaluation of penile discharge  Onset was this morning  Notes 1 recent sexual partner  Patient does not always use barrier protection  No fever  No abdominal pain  No specific dysuria or other urinary changes  No genital discomfort  Review of systems otherwise negative  He has no additional complaints at this time    On examination he appears in no acute distress  No abdominal CVA tenderness  Genital exam performed by the resident    Rocephin IM and oral doxycycline ordered awaiting urine studies    Urinalysis ordered but unclear why the lab has not run the sample almost two hours post collection  Patient requesting discharge  Lab contacted with no answers provided      Michoacano Bowens DO  Attending Emergency Physician       Jared Mcleod DO  10/25/21 [id number]

## 2021-12-20 ENCOUNTER — HOSPITAL ENCOUNTER (EMERGENCY)
Age: 41
Discharge: HOME | End: 2021-12-20
Attending: EMERGENCY MEDICINE
Payer: COMMERCIAL

## 2021-12-20 VITALS
RESPIRATION RATE: 18 BRPM | OXYGEN SATURATION: 100 % | SYSTOLIC BLOOD PRESSURE: 144 MMHG | DIASTOLIC BLOOD PRESSURE: 84 MMHG | HEART RATE: 72 BPM | TEMPERATURE: 98.7 F

## 2021-12-20 DIAGNOSIS — H57.9 SENSATION OF FOREIGN BODY IN EYE: Primary | ICD-10-CM

## 2021-12-20 PROCEDURE — 99282 EMERGENCY DEPT VISIT SF MDM: CPT

## 2021-12-20 RX ORDER — PROPARACAINE HYDROCHLORIDE 5 MG/ML
SOLUTION/ DROPS OPHTHALMIC
Status: DISCONTINUED
Start: 2021-12-20 | End: 2021-12-20 | Stop reason: HOSPADM

## 2021-12-20 RX ORDER — DIPHENHYDRAMINE HCL 25 MG
1 CAPSULE ORAL EVERY 4 HOURS PRN
Qty: 1 EACH | Refills: 0 | Status: SHIPPED | OUTPATIENT
Start: 2021-12-20

## 2021-12-20 RX ORDER — PROPARACAINE HYDROCHLORIDE 5 MG/ML
2 SOLUTION/ DROPS OPHTHALMIC ONCE
Status: DISCONTINUED | OUTPATIENT
Start: 2021-12-20 | End: 2021-12-20 | Stop reason: HOSPADM

## 2021-12-20 NOTE — ED PROVIDER NOTES
[facility]     Emergency Department     Faculty Attestation    I performed a history and physical examination of the patient and discussed management with the resident. I reviewed the resident´s note and agree with the documented findings and plan of care. Any areas of disagreement are noted on the chart. I was personally present for the key portions of any procedures. I have documented in the chart those procedures where I was not present during the key portions. I have reviewed the emergency nurses triage note. I agree with the chief complaint, past medical history, past surgical history, allergies, medications, social and family history as documented unless otherwise noted below. For Physician Assistant/ Nurse Practitioner cases/documentation I have personally evaluated this patient and have completed at least one if not all key elements of the E/M (history, physical exam, and MDM). Additional findings are as noted. Slit-lamp examination of the left eye shows round reactive pupil, anterior chamber clear, no foreign bodies or abrasions seen on the cornea.      Lucila Hollingsworth MD  12/20/21 [id number]

## 2021-12-20 NOTE — Clinical Note
Shima Bull was seen and treated in our emergency department on 12/20/2021. He may return to work on 12/22/2021. If you have any questions or concerns, please don't hesitate to call.       Hansa Mittal, DO

## 2021-12-21 ASSESSMENT — ENCOUNTER SYMPTOMS
EYE DISCHARGE: 0
NAUSEA: 0
ABDOMINAL PAIN: 0
SHORTNESS OF BREATH: 0

## 2021-12-21 NOTE — ED PROVIDER NOTES
[facility] ED  Emergency Department Encounter  EmergencyMedicine Resident     Pt Name:Mohsen Conrad  MRN: [id number]  Birthdate 1980  Date of evaluation: 12/21/21  PCP:  No primary care provider on file. This patient was evaluated in the Emergency Department for symptoms described in the history of present illness. The patient was evaluated in the context of the global COVID-19 pandemic, which necessitated consideration that the patient might be at risk for infection with the SARS-CoV-2 virus that causes COVID-19. Institutional protocols and algorithms that pertain to the evaluation of patients at risk for COVID-19 are in a state of rapid change based on information released by regulatory bodies including the CDC and federal and state organizations. These policies and algorithms were followed during the patient's care in the ED. CHIEF COMPLAINT       Chief Complaint   Patient presents with    Foreign Body in [address] [place] [address]  (Location/Symptom, Timing/Onset, Context/Setting, Quality, Duration, Modifying Factors, Severity.)      Darrell Schwartz is a 35 y.o. male who presents with complaint of a feeling of foreign body in the left eye. 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 No head injury loss of consciousness or other injury history of eye surgeries or injuries. PAST MEDICAL / SURGICAL / SOCIAL / FAMILY HISTORY      has no past medical history on file. Asked and none   has no past surgical history on file.   Asked and none    Social History     Socioeconomic History    Marital status: Single     Spouse name: Not on file    Number of children: Not on file    Years of education: Not on file    Highest education level: Not on file   Occupational History    Not on file   Tobacco Use    Smoking status: Current Every Day Smoker     Types: Cigarettes    Smokeless tobacco: Current User   Substance and Sexual Activity    Alcohol use: Not on file    Drug use: Yes     Types: Marijuana Darryle Pimple)    Sexual activity: Not on file   Other Topics Concern    Not on file   Social History Narrative    Not on file     Social Determinants of Health     Financial Resource Strain:     Difficulty of Paying Living Expenses: Not on file   Food Insecurity:     Worried About Running Out of Food in the Last Year: Not on file    Jose of Food in the Last Year: Not on file   Transportation Needs:     Lack of Transportation (Medical): Not on file    Lack of Transportation (Non-Medical): Not on file   Physical Activity:     Days of Exercise per Week: Not on file    Minutes of Exercise per Session: Not on file   Stress:     Feeling of Stress : Not on file   Social Connections:     Frequency of Communication with Friends and Family: Not on file    Frequency of Social Gatherings with Friends and Family: Not on file    Attends [employer] Services: Not on file    Active Member of [address] [place] [address] [employer] or Organizations: Not on file    Attends Club or Organization Meetings: Not on file    Marital Status: Not on file   Intimate Partner Violence:     Fear of Current or Ex-Partner: Not on file    Emotionally Abused: Not on file    Physically Abused: Not on file    Sexually Abused: Not on file   Housing Stability:     Unable to Pay for Housing in the Last Year: Not on file    Number of [place] in the Last Year: Not on file    Unstable Housing in the Last Year: Not on file       History reviewed. No pertinent family history. Allergies:  Patient has no known allergies. Home Medications:  Prior to Admission medications    Medication Sig Start Date End Date Taking? Authorizing Provider   dextran 70-hypromellose (ARTIFICIAL TEARS) 0.1-0.3 % SOLN opthalmic solution Place 1 drop into both eyes every 4 hours as needed (for eye irritation) 12/20/21  Yes Elmer Cooper, DO   METFORMIN HCL PO Take by mouth    Historical Provider, MD   ibuprofen (IBU) 800 MG tablet Take 1 tablet by mouth every 8 hours as needed for Pain 6/4/19   Valentino Mae, MD       REVIEW OF SYSTEMS    (2-9 systems for level 4, 10 or more for level 5)      Review of Systems   Constitutional: Negative for fever. HENT: Negative for congestion and dental problem. Eyes: Negative for discharge and visual disturbance. Foreign body sensation   Respiratory: Negative for shortness of breath. Cardiovascular: Negative for chest pain. Gastrointestinal: Negative for abdominal pain and nausea. Genitourinary: Negative for flank pain. Musculoskeletal: Negative for neck pain. Skin: Negative for pallor, rash and wound. Allergic/Immunologic: Negative for environmental allergies and food allergies. Neurological: Negative for headaches. Hematological: Negative for adenopathy. Psychiatric/Behavioral: Negative for agitation and confusion. PHYSICAL EXAM   (up to 7 for level 4, 8 or more for level 5)      INITIAL VITALS:   BP (!) 144/84   Pulse 72   Temp 98.7 °F (37.1 °C) (Oral)   Resp 18   SpO2 100%     Physical Exam  Vitals and nursing note reviewed. Constitutional:       General: He is not in acute distress. Appearance: Normal appearance. He is not toxic-appearing. HENT:      Head: Normocephalic and atraumatic. Right Ear: External ear normal.      Left Ear: External ear normal.      Nose: Nose normal.   Eyes:      General:         Right eye: No discharge. Left eye: No discharge. Conjunctiva/sclera: Conjunctivae normal.      Pupils: Pupils are equal, round, and reactive to light. Cardiovascular:      Rate and Rhythm: Normal rate. Pulses: Normal pulses. Abdominal:      Palpations: Abdomen is soft. Musculoskeletal:         General: Normal range of motion. Cervical back: Normal range of motion. Skin:     General: Skin is warm. Capillary Refill: Capillary refill takes less than 2 seconds. Neurological:      Mental Status: He is alert and oriented to person, place, and time. Psychiatric:         Mood and Affect: Mood normal.         DIFFERENTIAL  DIAGNOSIS     PLAN (LABS / IMAGING / EKG):  No orders of the defined types were placed in this encounter. MEDICATIONS ORDERED:  Orders Placed This Encounter   Medications    DISCONTD: proparacaine (ALCAINE) 0.5 % ophthalmic solution 2 drop    DISCONTD: fluorescein ophthalmic strip 1 mg    DISCONTD: proparacaine (ALCAINE) 0.5 % ophthalmic solution     Reza Nipple: cabinet override    dextran 70-hypromellose (ARTIFICIAL TEARS) 0.1-0.3 % SOLN opthalmic solution     Sig: Place 1 drop into both eyes every 4 hours as needed (for eye irritation)     Dispense:  1 each     Refill:  0       DDX: foreign body sensation left eye, corneal abrasion, retained fb, ulcer    DIAGNOSTIC RESULTS / [address] [place] [address] / [facility]   LAB RESULTS:  No results found for this visit on 12/20/21. IMPRESSION: Patient is alert oriented nontoxic 17-year-old male presents with sensation of a foreign body in his left eye after acutely got into this eye several hours ago. On examination patient is resting comfortably without any excessive tearing no purulent drainage extraocular muscles are intact pupils are reactive bilaterally no neck pain normal sensation around his eye no external signs of injury eyelids were inverted evaluate for foreign body. Examination. Vera lamp with and without fluorescein staining proparacaine demonstrated no evidence of any injuries. No retained foreign bodies. Patient was discharged home in stable condition with ophthalmology follow-up and artificial tears.     RADIOLOGY:      EKG      All EKG's are interpreted by the Emergency Department Physician who either signs or Co-signs this chart in the absence of a cardiologist.    EMERGENCY DEPARTMENT COURSE:  Seen and evaluated history is examination with signs and symptoms of this including slit-lamp examination both with and without fluorescein staining proparacaine showed no evidence of any acute ocular injuries or foreign body    PROCEDURES:  PROCEDURE NOTE - SLIT LAMP EXAM    PATIENT NAME: Madi Russ RECORD NO. [id number]  DATE: 12/21/2021  ATTENDING PHYSICIAN: tremaine    PREOPERATIVE DIAGNOSIS:  suspected eye injury  POSTOPERATIVE DIAGNOSIS:  Same  PROCEDURE PERFORMED:   Slit Lamp Exam  PERFORMING PHYSICIAN: Estefany Keita DO      DISCUSSION:  Adina Lam is a 35y.o.-year-old male who requires a slit lamp exam due to suspected eye injury. The history and physical examination were reviewed and confirmed. CONSENT: The patient provided verbal consent for this procedure. PROCEDURE:  The patient was placed in the appropriate position. Anesthesia was obtained using proparacaine drops in the left eye. Fluorescein staining was performed in the left eye and revealed no eye abrasions or increased uptake. The slit lamp exam findings were as follows: Left Eye: normal with no abnormalities observed. The patient tolerated the procedure well. COMPLICATIONS:   None     Estefany Keita DO  8:57 AM, 12/21/21        CONSULTS:  None    CRITICAL CARE:      FINAL IMPRESSION      1.  Sensation of foreign body in eye          DISPOSITION / PLAN     DISPOSITION Decision To Discharge 12/20/2021 02:27:45 PM      PATIENT REFERRED TO:  [facility] ED  [address] [place] [address]  [phone]  Go to   If symptoms worsen    [address] [address] [phone]  Schedule an appointment as soon as possible for a visit   asap      DISCHARGE MEDICATIONS:  Discharge Medication List as of 12/20/2021 2:30 PM      START taking these medications    Details   dextran 70-hypromellose (ARTIFICIAL TEARS) 0.1-0.3 % SOLN opthalmic solution Place 1 drop into both eyes every 4 hours as needed (for eye irritation), Disp-1 each, R-0Print             Nakul Henry DO  Emergency Medicine Resident    (Please note that portions of thisnote were completed with a voice recognition program.  Efforts were made to edit the dictations but occasionally words are mis-transcribed.)       Nakul Henry DO  Resident  12/21/21 4791

## 2022-02-28 ENCOUNTER — HOSPITAL ENCOUNTER (EMERGENCY)
Age: 42
Discharge: HOME | End: 2022-02-28
Attending: EMERGENCY MEDICINE
Payer: COMMERCIAL

## 2022-02-28 VITALS
OXYGEN SATURATION: 98 % | RESPIRATION RATE: 17 BRPM | TEMPERATURE: 97.3 F | SYSTOLIC BLOOD PRESSURE: 128 MMHG | HEART RATE: 61 BPM | DIASTOLIC BLOOD PRESSURE: 81 MMHG
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
## 2022-02-28 PROCEDURE — 96372 THER/PROPH/DIAG INJ SC/IM: CPT

## 2022-02-28 PROCEDURE — 6360000002 HC RX W HCPCS: Performed by: GENERAL PRACTICE

## 2022-02-28 PROCEDURE — 99283 EMERGENCY DEPT VISIT LOW MDM: CPT

## 2022-02-28 RX ORDER — IBUPROFEN 400 MG/1
400 TABLET ORAL EVERY 6 HOURS PRN
Qty: 20 TABLET | Refills: 0 | Status: SHIPPED | OUTPATIENT
Start: 2022-02-28

## 2022-02-28 RX ORDER — ORPHENADRINE CITRATE 30 MG/ML
60 INJECTION INTRAMUSCULAR; INTRAVENOUS ONCE
Status: COMPLETED | OUTPATIENT
Start: 2022-02-28 | End: 2022-02-28

## 2022-02-28 RX ORDER — KETOROLAC TROMETHAMINE 30 MG/ML
30 INJECTION, SOLUTION INTRAMUSCULAR; INTRAVENOUS ONCE
Status: COMPLETED | OUTPATIENT
Start: 2022-02-28 | End: 2022-02-28

## 2022-02-28 RX ORDER — CYCLOBENZAPRINE HCL 10 MG
10 TABLET ORAL 3 TIMES DAILY PRN
Qty: 10 TABLET | Refills: 0 | Status: SHIPPED | OUTPATIENT
Start: 2022-02-28 | End: 2022-03-10

## 2022-02-28 RX ADMIN — KETOROLAC TROMETHAMINE 30 MG: 30 INJECTION, SOLUTION INTRAMUSCULAR at 22:27

## 2022-02-28 RX ADMIN — ORPHENADRINE CITRATE 60 MG: 30 INJECTION INTRAMUSCULAR; INTRAVENOUS at 22:27

## 2022-02-28 ASSESSMENT — ENCOUNTER SYMPTOMS
SHORTNESS OF BREATH: 0
BACK PAIN: 0
VOMITING: 0
COUGH: 0
NAUSEA: 0
ABDOMINAL PAIN: 0
PHOTOPHOBIA: 0

## 2022-02-28 ASSESSMENT — PAIN DESCRIPTION - PAIN TYPE: TYPE: ACUTE PAIN

## 2022-02-28 ASSESSMENT — PAIN SCALES - GENERAL
PAINLEVEL_OUTOF10: 6
PAINLEVEL_OUTOF10: 6

## 2022-02-28 ASSESSMENT — PAIN DESCRIPTION - LOCATION: LOCATION: HEAD;BACK

## 2022-02-28 ASSESSMENT — PAIN - FUNCTIONAL ASSESSMENT: PAIN_FUNCTIONAL_ASSESSMENT: 0-10

## 2022-02-28 NOTE — Clinical Note
Pedrito Harmon was seen and treated in our emergency department on 2/28/2022. He may return to work on 03/01/2022. If you have any questions or concerns, please don't hesitate to call.       Tanesha Delgado, DO
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
## 2022-03-01 NOTE — ED NOTES
Patient discharged by ED resident. Patient ambulated to checkout. No IV access.       Kleber Aquino RN  02/28/22 7980
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
## 2022-05-24 ENCOUNTER — APPOINTMENT (OUTPATIENT)
Dept: GENERAL RADIOLOGY | Age: 42
End: 2022-05-24
Payer: COMMERCIAL

## 2022-05-24 ENCOUNTER — HOSPITAL ENCOUNTER (EMERGENCY)
Age: 42
Discharge: HOME | End: 2022-05-24
Attending: EMERGENCY MEDICINE
Payer: COMMERCIAL

## 2022-05-24 VITALS
RESPIRATION RATE: 18 BRPM | TEMPERATURE: 98.6 F | DIASTOLIC BLOOD PRESSURE: 86 MMHG | SYSTOLIC BLOOD PRESSURE: 135 MMHG | HEART RATE: 81 BPM | OXYGEN SATURATION: 99 %

## 2022-05-24 DIAGNOSIS — T14.8XXA PUNCTURE WOUND: Primary | ICD-10-CM

## 2022-05-24 PROCEDURE — 99284 EMERGENCY DEPT VISIT MOD MDM: CPT

## 2022-05-24 PROCEDURE — 90471 IMMUNIZATION ADMIN: CPT | Performed by: STUDENT IN AN ORGANIZED HEALTH CARE EDUCATION/TRAINING PROGRAM

## 2022-05-24 PROCEDURE — 6360000002 HC RX W HCPCS: Performed by: STUDENT IN AN ORGANIZED HEALTH CARE EDUCATION/TRAINING PROGRAM

## 2022-05-24 PROCEDURE — 6370000000 HC RX 637 (ALT 250 FOR IP): Performed by: STUDENT IN AN ORGANIZED HEALTH CARE EDUCATION/TRAINING PROGRAM

## 2022-05-24 PROCEDURE — 73630 X-RAY EXAM OF FOOT: CPT

## 2022-05-24 PROCEDURE — 90715 TDAP VACCINE 7 YRS/> IM: CPT | Performed by: STUDENT IN AN ORGANIZED HEALTH CARE EDUCATION/TRAINING PROGRAM

## 2022-05-24 RX ORDER — DOXYCYCLINE HYCLATE 100 MG
100 TABLET ORAL 2 TIMES DAILY
Qty: 14 TABLET | Refills: 0 | Status: SHIPPED | OUTPATIENT
Start: 2022-05-24 | End: 2022-05-31

## 2022-05-24 RX ORDER — DOXYCYCLINE HYCLATE 100 MG
100 TABLET ORAL ONCE
Status: COMPLETED | OUTPATIENT
Start: 2022-05-24 | End: 2022-05-24

## 2022-05-24 RX ORDER — CIPROFLOXACIN 500 MG/1
500 TABLET, FILM COATED ORAL ONCE
Status: COMPLETED | OUTPATIENT
Start: 2022-05-24 | End: 2022-05-24

## 2022-05-24 RX ORDER — CIPROFLOXACIN 500 MG/1
500 TABLET, FILM COATED ORAL 2 TIMES DAILY
Qty: 14 TABLET | Refills: 0 | Status: SHIPPED | OUTPATIENT
Start: 2022-05-24 | End: 2022-05-31

## 2022-05-24 RX ORDER — ACETAMINOPHEN 500 MG
1000 TABLET ORAL ONCE
Status: COMPLETED | OUTPATIENT
Start: 2022-05-24 | End: 2022-05-24

## 2022-05-24 RX ORDER — ACETAMINOPHEN 500 MG
1000 TABLET ORAL 3 TIMES DAILY
Qty: 540 TABLET | Refills: 0 | Status: SHIPPED | OUTPATIENT
Start: 2022-05-24

## 2022-05-24 RX ADMIN — DOXYCYCLINE HYCLATE 100 MG: 100 TABLET, COATED ORAL at 18:24

## 2022-05-24 RX ADMIN — TETANUS TOXOID, REDUCED DIPHTHERIA TOXOID AND ACELLULAR PERTUSSIS VACCINE, ADSORBED 0.5 ML: 5; 2.5; 8; 8; 2.5 SUSPENSION INTRAMUSCULAR at 18:25

## 2022-05-24 RX ADMIN — CIPROFLOXACIN HYDROCHLORIDE 500 MG: 500 TABLET, FILM COATED ORAL at 18:25

## 2022-05-24 RX ADMIN — ACETAMINOPHEN 1000 MG: 500 TABLET ORAL at 18:25

## 2022-05-24 NOTE — ED PROVIDER NOTES
[facility]  Emergency Department  Faculty Attestation     I performed a history and physical examination of the patient and discussed management with the resident. I reviewed the residents note and agree with the documented findings and plan of care. Any areas of disagreement are noted on the chart. I was personally present for the key portions of any procedures. I have documented in the chart those procedures where I was not present during the key portions. I have reviewed the emergency nurses triage note. I agree with the chief complaint, past medical history, past surgical history, allergies, medications, social and family history as documented unless otherwise noted below. For Physician Assistant/ Nurse Practitioner cases/documentation I have personally evaluated this patient and have completed at least one if not all key elements of the E/M (history, physical exam, and MDM). Additional findings are as noted. Primary Care Physician:  LACIE Ortiz    Screenings:  [unfilled]    CHIEF COMPLAINT       Chief Complaint   Patient presents with    Foot Injury     stepped on a nail       RECENT VITALS:   Temp: 98.6 °F (37 °C),  Heart Rate: 81, Resp: 18, BP: 135/86    LABS:  Labs Reviewed - No data to display    Radiology  XR FOOT RIGHT (MIN 3 VIEWS)    (Results Pending)       Attending Physician Additional  Notes    Patient has a plantar puncture wound on his great toe through his sneaker while at work. There is no bleeding. He is diabetic. On exam there is a superficial wound without significant debris noted or active bleeding. Plan is imaging, update tetanus, analgesics, antibiotics, podiatry follow-up, return precautions. Patient was instructed regarding risk of infection especially osteomyelitis. [place].  Mu Morin MD, [facility]  Attending Emergency  Physician               Patricio Bar MD  05/24/22 [id number]

## 2022-05-24 NOTE — ED NOTES
Pt to ED with c/o right foot pain on great toe from stepping on a nail. No active bleeding noted. Pt reports hx of diabetes and wanted to avoid infection or further complications.  Pt triaged, [facility]  05/24/22 9626

## 2022-05-24 NOTE — ED PROVIDER NOTES
[facility]  ED  Emergency Department Encounter  EmergencyMedicine Resident     Pt Name:Mohsen Ambrose Eye  MRN: [id number]  [place] 1980  Date of evaluation: 5/24/22  PCP:  LACIE Mark    CHIEF COMPLAINT       Chief Complaint   Patient presents with    Foot Injury     stepped on a nail       HISTORY OF PRESENT ILLNESS  (Location/Symptom, Timing/Onset, Context/Setting, Quality, Duration, Modifying Factors, Severity.)      Yuan Jones is a 38 y.o. male who presents with stepped on nail through boot and into foot just prior to arrival.  Was at work. History of diabetes takes diabetic medication otherwise no significant past medical history denies allergies. Pain is minimal.  He is ambulatory denies other injury falling hitting his head chest pain shortness of breath nausea vomiting fever diarrhea. Unknown tetanus vaccination status. PAST MEDICAL / SURGICAL / SOCIAL / FAMILY HISTORY      has no past medical history on file. Denies     has no past surgical history on file. Denies    Social History     Socioeconomic History    Marital status: Single     Spouse name: Not on file    Number of children: Not on file    Years of education: Not on file    Highest education level: Not on file   Occupational History    Not on file   Tobacco Use    Smoking status: Current Every Day Smoker     Types: Cigarettes    Smokeless tobacco: Current User   Substance and Sexual Activity    Alcohol use: Not on file    Drug use: Yes     Types: Marijuana Adriana Lua)    Sexual activity: Not on file   Other Topics Concern    Not on file   Social History Narrative    Not on file     Social Determinants of Health     Financial Resource Strain:     Difficulty of Paying Living Expenses: Not on file   Food Insecurity:     Worried About Running Out of Food in the Last Year: Not on file    Jose of Food in the Last Year: Not on file   Transportation Needs:     Lack of Transportation (Medical):  Not on file    Lack of Transportation (Non-Medical): Not on file   Physical Activity:     Days of Exercise per Week: Not on file    Minutes of Exercise per Session: Not on file   Stress:     Feeling of Stress : Not on file   Social Connections:     Frequency of Communication with Friends and Family: Not on file    Frequency of Social Gatherings with Friends and Family: Not on file    Attends [employer] Services: Not on file    Active Member of [address] [place] [address] or Organizations: Not on file    Attends Club or Organization Meetings: Not on file    Marital Status: Not on file   Intimate Partner Violence:     Fear of Current or Ex-Partner: Not on file    Emotionally Abused: Not on file    Physically Abused: Not on file    Sexually Abused: Not on file   Housing Stability:     Unable to Pay for Housing in the Last Year: Not on file    Number of [place] in the Last Year: Not on file    Unstable Housing in the Last Year: Not on file       No family history on file. Allergies:  Patient has no known allergies. Home Medications:  Prior to Admission medications    Medication Sig Start Date End Date Taking?  Authorizing Provider   ciprofloxacin (CIPRO) 500 mg tablet Take 1 tablet by mouth 2 times daily for 7 days 5/24/22 5/31/22 Yes Carlee Medellin MD   doxycycline hyclate (VIBRA-TABS) 100 MG tablet Take 1 tablet by mouth 2 times daily for 7 days 5/24/22 5/31/22 Yes Carlee Medellin MD   acetaminophen (TYLENOL) 500 MG tablet Take 2 tablets by mouth 3 times daily 5/24/22  Yes Carlee Medellin MD   ibuprofen (IBU) 400 MG tablet Take 1 tablet by mouth every 6 hours as needed for Pain 2/28/22   Yasmani Casanova,    dextran 70-hypromellose (ARTIFICIAL TEARS) 0.1-0.3 % SOLN opthalmic solution Place 1 drop into both eyes every 4 hours as needed (for eye irritation) 12/20/21   Elmer Cabrera DO   METFORMIN HCL PO Take by mouth    Historical Provider, MD       REVIEW OF SYSTEMS    (2-9 systems for level 4, 10 or more for level 5)      Review of Systems   Constitutional: Negative for fever. HENT: Negative for congestion. Eyes: Negative for photophobia. Respiratory: Negative for shortness of breath. Cardiovascular: Negative for chest pain. Gastrointestinal: Negative for abdominal pain and vomiting. Endocrine: Negative for polyuria. Genitourinary: Negative for dysuria. Musculoskeletal: Negative for arthralgias. Skin: Positive for wound  Allergic/Immunologic: Negative for immunocompromised state. Neurological: Negative for dizziness. Hematological: Does not bruise/bleed easily. Psychiatric/Behavioral: Negative for agitation. PHYSICAL EXAM   (up to 7 for level 4, 8 or more for level 5)      INITIAL VITALS:   /86   Pulse 81   Temp 98.6 °F (37 °C) (Oral)   Resp 18   SpO2 99%     Physical Exam  Constitutional:       General: Not in acute distress. Appearance: Normal appearance. Normal weight. Not toxic-appearing. HENT:      Head: Normocephalic and atraumatic. Nose: Nose normal.      Mouth/Throat: Mucous membranes are moist.  Uvula midline no oropharyngeal edema. Pharynx: Oropharynx is clear. Eyes:      Extraocular Movements: Extraocular movements intact. Conjunctiva/sclera: Conjunctivae normal.      Pupils: Pupils are equal, round, and reactive to light. Neck:      Musculoskeletal: Normal range of motion and neck supple. No neck rigidity. Cardiovascular:      Rate and Rhythm: Normal rate and regular rhythm. Pulses: Normal pulses. Heart sounds: Normal heart sounds. No murmur. Pulmonary:      Effort: Pulmonary effort is normal.      Breath sounds: Normal breath sounds. No wheezing. Abdominal:      General: Abdomen is flat. Bowel sounds are normal.      Tenderness: There is no abdominal tenderness. Musculoskeletal:     Normal range of motion. General: No swelling or tenderness.  No LE edema    Skin:  Small punctate wound base of right big toe no bleeding drainage discharge    Neurological:      General: No focal deficit present. Mental Status: Alert and oriented to person, place, and time. Mental status is at baseline. Motor: No weakness. DIFFERENTIAL  DIAGNOSIS     PLAN (LABS / IMAGING / EKG):  Orders Placed This Encounter   Procedures    XR FOOT RIGHT (MIN 3 VIEWS)       MEDICATIONS ORDERED:  Orders Placed This Encounter   Medications    acetaminophen (TYLENOL) tablet 1,000 mg    Tetanus-Diphth-Acell Pertussis (BOOSTRIX) injection 0.5 mL    doxycycline hyclate (VIBRA-TABS) tablet 100 mg     Order Specific Question:   Antimicrobial Indications     Answer: Other     Order Specific Question:   Other Abx Indication     Answer:   pep ppx foot wound    ciprofloxacin (CIPRO) tablet 500 mg     Order Specific Question:   Antimicrobial Indications     Answer: Other     Order Specific Question:   Other Abx Indication     Answer:   pep ppx diabetic puncture foot    ciprofloxacin (CIPRO) 500 mg tablet     Sig: Take 1 tablet by mouth 2 times daily for 7 days     Dispense:  14 tablet     Refill:  0    doxycycline hyclate (VIBRA-TABS) 100 MG tablet     Sig: Take 1 tablet by mouth 2 times daily for 7 days     Dispense:  14 tablet     Refill:  0    acetaminophen (TYLENOL) 500 MG tablet     Sig: Take 2 tablets by mouth 3 times daily     Dispense:  540 tablet     Refill:  0           DIAGNOSTIC RESULTS / EMERGENCY DEPARTMENT COURSE / MDM     LABS:  No results found for this visit on 05/24/22. RADIOLOGY:  None    EKG  None    All EKG's are interpreted by the Emergency Department Physician who either signs or Co-signs this chart in the absence of a cardiologist.    EMERGENCY DEPARTMENT COURSE:  Patient breathing quietly and unlabored on room air. Speech is normal and speaking in full sentences without requiring to pause to take a breath. Reassuring physical exam small punctate wound no ecchymosis bruising bleeding drainage discharge.   No bony deformity range of motion normal compartment soft distal cap refill normal.  Vital signs stable afebrile. Patient ambulatory normal gait no ataxia. Given immunocompromise status, diabetes will x-ray rule out foreign body prophylactic treatment with Cipro and doxycycline to cover Pseudomonas and skin hermann. We will follow-up with podiatry. Patient refusing to fill out Workmen's Compensation paperwork. Works for [employer],[facility] Floor:  None    CONSULTS:  None    CRITICAL CARE:  None    FINAL IMPRESSION      1.  Puncture wound          DISPOSITION / PLAN     DISPOSITION Decision To Discharge 05/24/2022 06:46:24 PM      PATIENT REFERRED TO:  [facility] [address]  [address]  [address] [facility] [address] [place] [address]  [phone]  Schedule an appointment as soon as possible for a visit in 3 days        DISCHARGE MEDICATIONS:  New Prescriptions    ACETAMINOPHEN (TYLENOL) 500 MG TABLET    Take 2 tablets by mouth 3 times daily    CIPROFLOXACIN (CIPRO) 500 MG TABLET    Take 1 tablet by mouth 2 times daily for 7 days    DOXYCYCLINE HYCLATE (VIBRA-TABS) 100 MG TABLET    Take 1 tablet by mouth 2 times daily for 7 days       John Hess MD  Emergency Medicine Resident    (Please note that portions of thisnote were completed with a voice recognition program.  Efforts were made to edit the dictations but occasionally words are mis-transcribed.)       John Hess MD  Resident  05/24/22 2547

## 2022-12-23 ENCOUNTER — HOSPITAL ENCOUNTER (EMERGENCY)
Age: 42
Discharge: HOME | End: 2022-12-23
Attending: EMERGENCY MEDICINE
Payer: COMMERCIAL

## 2022-12-23 VITALS
SYSTOLIC BLOOD PRESSURE: 126 MMHG | OXYGEN SATURATION: 98 % | WEIGHT: 160 LBS | BODY MASS INDEX: 23.7 KG/M2 | TEMPERATURE: 98.5 F | HEIGHT: 69 IN | DIASTOLIC BLOOD PRESSURE: 78 MMHG | HEART RATE: 70 BPM | RESPIRATION RATE: 16 BRPM

## 2022-12-23 DIAGNOSIS — Z20.2 STD EXPOSURE: Primary | ICD-10-CM

## 2022-12-23 LAB
BILIRUBIN URINE: NEGATIVE
CASTS UA: NORMAL /LPF (ref 0–8)
COLOR: YELLOW
EPITHELIAL CELLS UA: NORMAL /HPF (ref 0–5)
GLUCOSE URINE: ABNORMAL
HIV AG/AB: NONREACTIVE
KETONES, URINE: NEGATIVE
LEUKOCYTE ESTERASE, URINE: ABNORMAL
NITRITE, URINE: NEGATIVE
PH UA: 6 (ref 5–8)
PROTEIN UA: NEGATIVE
RBC UA: NORMAL /HPF (ref 0–4)
SPECIFIC GRAVITY UA: 1.04 (ref 1–1.03)
T. PALLIDUM, IGG: NONREACTIVE
TURBIDITY: CLEAR
URINE HGB: ABNORMAL
UROBILINOGEN, URINE: NORMAL
WBC UA: NORMAL /HPF (ref 0–5)

## 2022-12-23 PROCEDURE — 6360000002 HC RX W HCPCS: Performed by: EMERGENCY MEDICINE

## 2022-12-23 PROCEDURE — 81001 URINALYSIS AUTO W/SCOPE: CPT

## 2022-12-23 PROCEDURE — 99284 EMERGENCY DEPT VISIT MOD MDM: CPT

## 2022-12-23 PROCEDURE — 87591 N.GONORRHOEAE DNA AMP PROB: CPT

## 2022-12-23 PROCEDURE — 87389 HIV-1 AG W/HIV-1&-2 AB AG IA: CPT

## 2022-12-23 PROCEDURE — 87086 URINE CULTURE/COLONY COUNT: CPT

## 2022-12-23 PROCEDURE — 96372 THER/PROPH/DIAG INJ SC/IM: CPT

## 2022-12-23 PROCEDURE — 87491 CHLMYD TRACH DNA AMP PROBE: CPT

## 2022-12-23 PROCEDURE — 86780 TREPONEMA PALLIDUM: CPT

## 2022-12-23 RX ORDER — ATORVASTATIN CALCIUM 40 MG/1
40 TABLET, FILM COATED ORAL DAILY
COMMUNITY
Start: 2022-02-02

## 2022-12-23 RX ORDER — LISINOPRIL 10 MG/1
10 TABLET ORAL DAILY
COMMUNITY
Start: 2022-11-03

## 2022-12-23 RX ORDER — INSULIN GLARGINE 100 [IU]/ML
INJECTION, SOLUTION SUBCUTANEOUS
COMMUNITY
Start: 2022-06-21

## 2022-12-23 RX ORDER — CEFTRIAXONE 500 MG/1
500 INJECTION, POWDER, FOR SOLUTION INTRAMUSCULAR; INTRAVENOUS ONCE
Status: COMPLETED | OUTPATIENT
Start: 2022-12-23 | End: 2022-12-23

## 2022-12-23 RX ORDER — DOXYCYCLINE HYCLATE 100 MG
100 TABLET ORAL 2 TIMES DAILY
Qty: 14 TABLET | Refills: 0 | Status: SHIPPED | OUTPATIENT
Start: 2022-12-23 | End: 2022-12-30

## 2022-12-23 RX ORDER — GLYBURIDE 2.5 MG/1
TABLET ORAL
COMMUNITY

## 2022-12-23 RX ORDER — ASPIRIN 81 MG/1
TABLET ORAL
COMMUNITY

## 2022-12-23 RX ADMIN — CEFTRIAXONE SODIUM 500 MG: 500 INJECTION, POWDER, FOR SOLUTION INTRAMUSCULAR; INTRAVENOUS at 20:39

## 2022-12-23 ASSESSMENT — ENCOUNTER SYMPTOMS
DIARRHEA: 0
NAUSEA: 0
BLOOD IN STOOL: 0
VOMITING: 0
ABDOMINAL PAIN: 0

## 2022-12-23 ASSESSMENT — PAIN - FUNCTIONAL ASSESSMENT: PAIN_FUNCTIONAL_ASSESSMENT: NONE - DENIES PAIN

## 2022-12-24 LAB
CULTURE: NORMAL
SPECIMEN DESCRIPTION: NORMAL

## 2022-12-24 NOTE — ED PROVIDER NOTES
[facility]     Emergency Department     Faculty Attestation        I performed a history and physical examination of the patient and discussed management with the resident. I reviewed the residents note and agree with the documented findings and plan of care. Any areas of disagreement are noted on the chart. I was personally present for the key portions of any procedures. I have documented in the chart those procedures where I was not present during the key portions. I have reviewed the emergency nurses triage note. I agree with the chief complaint, past medical history, past surgical history, allergies, medications, social and family history as documented unless otherwise noted below. For mid-level providers such as nurse practitioners as well as physicians assistants:    I have personally seen and evaluated the patient. I find the patient's history and physical exam are consistent with NP/PA documentation. I agree with the care provided, treatment rendered, disposition, & follow-up plan. Additional findings are as noted.     Vital Signs: /78   Pulse 70   Temp 98.5 °F (36.9 °C) (Oral)   Resp 16   Ht 5' 9\" (1.753 m)   Wt 160 lb (72.6 kg)   SpO2 98%   BMI 23.63 kg/m²   PCP:  LACIE Canseco    Pertinent Comments:           Critical Care  None          John Montiel MD    Attending Emergency Medicine Physician            Pratik Stubbs MD  12/23/22 2015 No
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
## 2022-12-24 NOTE — DISCHARGE INSTRUCTIONS
Mike Davis!!!    From [address] [place] [address] Emergency Department    On behalf of the Emergency Department staff at [facility], I would like to thank you for giving [address] [place] [address] the opportunity to address your health care needs and concerns. You were seen today for concern for STI. Please take antibiotics as prescribed. Please wear a condom when having sex to protect yourself and your partner. Please follow up results on MyChart     If you notice any concerning symptoms please return to the ER immediately. These can include but are not limited to: fevers, chills, shortness of breath, vomiting, weakness of the extremities, changes in your mental status, numbness, pale extremities, or chest pain. Medications: Continue taking your home medications as previously directed. Please take antibiotics as prescribed. Follow up: Please follow up with your primary care doctor within one week or as needed.

## 2022-12-24 NOTE — ED PROVIDER NOTES
[facility]  ED  Emergency Department Encounter  Emergency Medicine Resident     Pt Name:Mohsen Jasso  MRN: [id number]  [place] 1980  Date of evaluation: 12/23/22  PCP:  Sunnie Scheuermann, APRN      CHIEF COMPLAINT       Chief Complaint   Patient presents with    Exposure to STD       HISTORY OF PRESENT ILLNESS  (Location/Symptom, Timing/Onset, Context/Setting, Quality, Duration, Modifying Factors, Severity.)      Shannon Escobar is a 38 y.o. male who presents with concern for exposure to STD. Patient states he has had multiple recent partners and does not wear condoms. He was concerned he bruised his penis because he had some pain in the glans, the next day he started having yellow discharge. He denies dysuria or hematuria. NO fevers or abd pain. Has been treated for STDs in the past and has been seen for skin lesion on his penis. PAST MEDICAL / SURGICAL / SOCIAL / FAMILY HISTORY     PMHx of HTN, DM    No pertinent surgical hx     Social History     Socioeconomic History    Marital status: Single     Spouse name: Not on file    Number of children: Not on file    Years of education: Not on file    Highest education level: Not on file   Occupational History    Not on file   Tobacco Use    Smoking status: Every Day     Types: Cigarettes    Smokeless tobacco: Current   Substance and Sexual Activity    Alcohol use: Not on file    Drug use: Yes     Types: Marijuana Sable Mingle)    Sexual activity: Not on file   Other Topics Concern    Not on file   Social History Narrative    Not on file     Social Determinants of Health     Financial Resource Strain: Not on file   Food Insecurity: Not on file   Transportation Needs: Not on file   Physical Activity: Not on file   Stress: Not on file   Social Connections: Not on file   Intimate Partner Violence: Not on file   Housing Stability: Not on file       History reviewed. No pertinent family history. Allergies:  Patient has no known allergies.     Home Medications:  Prior to Admission medications    Medication Sig Start Date End Date Taking? Authorizing Provider   insulin glargine (LANTUS SOLOSTAR) 100 UNIT/ML injection pen INJECT 15 UNITS SUBCUTANEOUSLY TWICE DAILY 6/21/22  Yes Historical Provider, MD   Cholecalciferol 50 MCG (2000 UT) TABS Take 2,000 Units by mouth daily 2/2/22  Yes Historical Provider, MD   atorvastatin (LIPITOR) 40 MG tablet Take 40 mg by mouth daily 2/2/22  Yes Historical Provider, MD   empagliflozin (JARDIANCE) 10 MG tablet Take 10 mg by mouth daily 11/3/22  Yes Historical Provider, MD   lisinopril (PRINIVIL;ZESTRIL) 10 MG tablet Take 10 mg by mouth daily 11/3/22  Yes Historical Provider, MD   doxycycline hyclate (VIBRA-TABS) 100 MG tablet Take 1 tablet by mouth 2 times daily for 7 days 12/23/22 12/30/22 Yes Drew Craig, DO   glyBURIDE (DIABETA) 2.5 MG tablet glyburide 2.5 mg tablet    Historical Provider, MD   metFORMIN (GLUCOPHAGE) 1000 MG tablet metformin 1,000 mg tablet   TAKE 1 TABLET BY MOUTH TWICE DAILY WITH MEALS    Historical Provider, MD   aspirin 81 MG EC tablet aspirin 81 mg tablet,delayed release   TAKE ONE TABLET BY MOUTH ONCE A DAY    Historical Provider, MD   acetaminophen (TYLENOL) 500 MG tablet Take 2 tablets by mouth 3 times daily 5/24/22   Ann Demarco MD   ibuprofen (IBU) 400 MG tablet Take 1 tablet by mouth every 6 hours as needed for Pain 2/28/22   Doar Memos, DO   dextran 70-hypromellose (ARTIFICIAL TEARS) 0.1-0.3 % SOLN opthalmic solution Place 1 drop into both eyes every 4 hours as needed (for eye irritation) 12/20/21   Elmer Cabrera, DO   METFORMIN HCL PO Take by mouth    Historical Provider, MD       REVIEW OF SYSTEMS    (2-9 systems for level 4, 10 or more for level 5)      Review of Systems   Constitutional:  Negative for chills and fever. Gastrointestinal:  Negative for abdominal pain, blood in stool, diarrhea, nausea and vomiting.    Genitourinary:  Positive for penile discharge and penile pain. Negative for dysuria, hematuria and testicular pain. Allergic/Immunologic: Positive for immunocompromised state. Hematological:  Does not bruise/bleed easily. PHYSICAL EXAM   (up to 7 for level 4, 8 or more for level 5)      INITIAL VITALS:   /78   Pulse 70   Temp 98.5 °F (36.9 °C) (Oral)   Resp 16   Ht 5' 9\" (1.753 m)   Wt 160 lb (72.6 kg)   SpO2 98%   BMI 23.63 kg/m²     Physical Exam  Exam conducted with a chaperone present. Constitutional:       General: He is not in acute distress. HENT:      Head: Normocephalic and atraumatic. Cardiovascular:      Rate and Rhythm: Normal rate and regular rhythm. Heart sounds: Normal heart sounds. Pulmonary:      Effort: Pulmonary effort is normal.      Breath sounds: Normal breath sounds. Abdominal:      General: There is no distension. Palpations: Abdomen is soft. Tenderness: There is no abdominal tenderness. There is no guarding. Hernia: There is no hernia in the left inguinal area or right inguinal area. Genitourinary:     Penis: Circumcised. Lesions present. Testes:         Right: Tenderness not present. Left: Tenderness not present. Epididymis:      Right: Normal.      Left: Normal.   Lymphadenopathy:      Lower Body: No right inguinal adenopathy. No left inguinal adenopathy. Skin:     General: Skin is warm. Neurological:      General: No focal deficit present. Mental Status: He is alert.    Psychiatric:         Mood and Affect: Mood normal.       DIFFERENTIAL  DIAGNOSIS     PLAN (LABS / IMAGING / EKG):  Orders Placed This Encounter   Procedures    C.trachomatis N.gonorrhoeae DNA, Urine    Urinalysis with Reflex to Culture    T. pallidum Ab    HIV Screen       MEDICATIONS ORDERED:  Orders Placed This Encounter   Medications    cefTRIAXone (ROCEPHIN) injection 500 mg     Order Specific Question:   Antimicrobial Indications     Answer:   STD infection    doxycycline hyclate (VIBRA-TABS) 100 MG tablet     Sig: Take 1 tablet by mouth 2 times daily for 7 days     Dispense:  14 tablet     Refill:  0       MDM: Patient with concern for STD exposure. Yellow penile discharge for 2-3 days. No dysuria or hematuria, so systemic sx. On PE no lymphadenopathy, no discharge visualized. Penile wart patient states he is seen by dermatology for. No testicular or penile tenderness. Will obtain gonorrhea/chlamydia tests and treat empirically. Will also obtain syphilis and HIV at patients request. Recommended to follow up results on MyChart. Discussed safe sex and condom use with patient and recommendation to complete course of abx. Patient vocalized understanding. DIAGNOSTIC RESULTS / EMERGENCY DEPARTMENT COURSE / MDM   LAB RESULTS:  No results found for this visit on 12/23/22. IMPRESSION: STD exposure with penile discharge. Treat today for gonorrhea and chlamydia. Patient agreeable to follow up results on MyChart. RADIOLOGY:  No orders to display     EMERGENCY DEPARTMENT COURSE:           No notes of EC Admission Criteria type on file. PROCEDURES:      CONSULTS:  None    CRITICAL CARE:      FINAL IMPRESSION      1.  STD exposure          DISPOSITION / PLAN     DISPOSITION Decision To Discharge 12/23/2022 08:31:32 PM      PATIENT REFERRED TO:  LACIE Akhtar  [address] [place] [address]  [phone]          DISCHARGE MEDICATIONS:  New Prescriptions    DOXYCYCLINE HYCLATE (VIBRA-TABS) 100 MG TABLET    Take 1 tablet by mouth 2 times daily for 7 days       Joaquina Peoples DO  Emergency Medicine Resident    (Please note that portions of thisnote were completed with a voice recognition program.  Efforts were made to edit the dictations but occasionally words are mis-transcribed.)        Joaquina Peoples DO  Resident  12/23/22 [id number]

## 2022-12-26 LAB
C. TRACHOMATIS DNA ,URINE: NEGATIVE
CULTURE: NORMAL
N. GONORRHOEAE DNA, URINE: NEGATIVE
SPECIMEN DESCRIPTION: NORMAL
SPECIMEN DESCRIPTION: NORMAL

## 2023-02-28 ENCOUNTER — HOSPITAL ENCOUNTER (EMERGENCY)
Age: 43
Discharge: HOME | End: 2023-03-01
Attending: EMERGENCY MEDICINE
Payer: COMMERCIAL

## 2023-02-28 VITALS
SYSTOLIC BLOOD PRESSURE: 130 MMHG | DIASTOLIC BLOOD PRESSURE: 81 MMHG | TEMPERATURE: 97.7 F | RESPIRATION RATE: 20 BRPM | OXYGEN SATURATION: 100 % | HEART RATE: 56 BPM

## 2023-02-28 DIAGNOSIS — N30.00 ACUTE CYSTITIS WITHOUT HEMATURIA: Primary | ICD-10-CM

## 2023-02-28 PROCEDURE — 87591 N.GONORRHOEAE DNA AMP PROB: CPT

## 2023-02-28 PROCEDURE — 87491 CHLMYD TRACH DNA AMP PROBE: CPT

## 2023-02-28 PROCEDURE — 87086 URINE CULTURE/COLONY COUNT: CPT

## 2023-02-28 PROCEDURE — 81001 URINALYSIS AUTO W/SCOPE: CPT

## 2023-02-28 PROCEDURE — 99283 EMERGENCY DEPT VISIT LOW MDM: CPT

## 2023-02-28 ASSESSMENT — PAIN - FUNCTIONAL ASSESSMENT: PAIN_FUNCTIONAL_ASSESSMENT: NONE - DENIES PAIN

## 2023-03-01 VITALS
OXYGEN SATURATION: 99 % | SYSTOLIC BLOOD PRESSURE: 169 MMHG | DIASTOLIC BLOOD PRESSURE: 94 MMHG | HEART RATE: 76 BPM | RESPIRATION RATE: 15 BRPM

## 2023-03-01 DIAGNOSIS — A59.9 TRICHIMONIASIS: Primary | ICD-10-CM

## 2023-03-01 LAB
BILIRUBIN URINE: NEGATIVE
CASTS UA: NORMAL /LPF (ref 0–8)
CHLAMYDIA DNA UR QL NAA+PROBE: NEGATIVE
COLOR: YELLOW
EPITHELIAL CELLS UA: NORMAL /HPF (ref 0–5)
GLUCOSE UR STRIP.AUTO-MCNC: ABNORMAL MG/DL
KETONES UR STRIP.AUTO-MCNC: NEGATIVE MG/DL
LEUKOCYTE ESTERASE UR QL STRIP.AUTO: ABNORMAL
MICROORGANISM SPEC CULT: NO GROWTH
N GONORRHOEA DNA UR QL NAA+PROBE: NEGATIVE
NITRITE UR QL STRIP.AUTO: NEGATIVE
PROT UR STRIP.AUTO-MCNC: 6 MG/DL (ref 5–8)
PROT UR STRIP.AUTO-MCNC: NEGATIVE MG/DL
RBC CLUMPS #/AREA URNS AUTO: NORMAL /HPF (ref 0–4)
SOURCE: ABNORMAL
SPECIFIC GRAVITY UA: 1.03 (ref 1–1.03)
SPECIMEN DESCRIPTION: NORMAL
SPECIMEN DESCRIPTION: NORMAL
TRICHOMONAS VAGINALI, MOLECULAR: POSITIVE
TURBIDITY: CLEAR
URINE HGB: NEGATIVE
UROBILINOGEN, URINE: NORMAL
WBC UA: NORMAL /HPF (ref 0–5)

## 2023-03-01 PROCEDURE — 87661 TRICHOMONAS VAGINALIS AMPLIF: CPT

## 2023-03-01 PROCEDURE — 99283 EMERGENCY DEPT VISIT LOW MDM: CPT

## 2023-03-01 PROCEDURE — 6370000000 HC RX 637 (ALT 250 FOR IP)

## 2023-03-01 RX ORDER — METRONIDAZOLE 500 MG/1
2000 TABLET ORAL ONCE
Status: COMPLETED | OUTPATIENT
Start: 2023-03-01 | End: 2023-03-01

## 2023-03-01 RX ADMIN — METRONIDAZOLE 2000 MG: 500 TABLET ORAL at 18:05

## 2023-03-01 ASSESSMENT — PAIN - FUNCTIONAL ASSESSMENT: PAIN_FUNCTIONAL_ASSESSMENT: 0-10

## 2023-03-01 ASSESSMENT — ENCOUNTER SYMPTOMS
SORE THROAT: 0
TROUBLE SWALLOWING: 0
SHORTNESS OF BREATH: 0
DIARRHEA: 0
NAUSEA: 0
CHEST TIGHTNESS: 0
SHORTNESS OF BREATH: 0
NAUSEA: 0
CHEST TIGHTNESS: 0
WHEEZING: 0
ABDOMINAL PAIN: 0
BACK PAIN: 0
SORE THROAT: 0
VOMITING: 0
VOMITING: 0

## 2023-03-01 ASSESSMENT — PAIN SCALES - GENERAL: PAINLEVEL_OUTOF10: 5

## 2023-03-01 NOTE — ED PROVIDER NOTES
I performed a history and physical examination of the patient and discussed management with the resident. I reviewed the residents note and agree with the documented findings and plan of care. Any areas of disagreement are noted on the chart. I was personally present for the key portions of any procedures. I have documented in the chart those procedures where I was not present during the key portions. I have reviewed the emergency nurses triage note. I agree with the chief complaint, past medical history, past surgical history, allergies, medications, social and family history as documented unless otherwise noted below. Documentation of the HPI, Physical Exam and Medical Decision Making performed by medical students or scribes is based on my personal performance of the HPI, PE and MDM. For Phys Assistant/ Nurse Practitioner cases/documentation I have personally evaluated this patient and have completed at least one if not all key elements of the E/M (history, physical exam, and MDM). I find the patient's history and physical exam are consistent with the NP/PA documentation. I agree with the care provided, treatment rendered, disposition and followup plan. Additional findings are as noted. Tony Paniagua. Elisa Puckett MD  Attending Emergency  Physician        Patient presented to the emergency room with complaints of urethral discharge and some dysuria that has been intermittent for the past several months. Patient reports he was evaluated here several months ago and was apparently treated for chlamydia and gonorrhea. He was evaluated here yesterday with similar symptoms and returns now after finding that his trichomonas screen was positive. He denies any penile sores or lesions. Denies any fevers or chills. Does have a history of unprotected intercourse in the past.  On examination patient is awake and alert. He is cooperative and responsive. Speech is fluent and comprehension is normal.  GCS is 15.   Patient is ambulatory and without any obvious significant complaints. Impression: Trichomoniasis. Plan: Patient will receive metronidazole 2 g orally. He is advised to follow-up with his primary care provider as needed. He is asked to return should his symptoms worsen, recur, or progress.      Maribel Lassiter MD  03/01/23 [id number]

## 2023-03-01 NOTE — DISCHARGE INSTRUCTIONS
Call today or tomorrow to follow up with LACIE Tavarez  in 2 days. Please follow up with your results on my chart. Should your results come back negative, please call the number for urology included in this discharge    PLEASE avoid having any sex until you have received result and treatment for any positive tests    Return to the Emergency Department for fever > 101.5, inability to urinate, burning when you urinate, increase in the number of times or if you have an urgency to urinate, any other care or concern.

## 2023-03-01 NOTE — ED NOTES
Pt to ED with c/o burning in urination and penile dishcarges. Pt states he has STI's last year and did not take the meds correctly and still have unprotected sex. Pt states he is concerned he still has STI. Pt denies fever and chills.      Josh Baron RN  02/28/23 883 Kyler Naik, MARY  02/28/23 7110

## 2023-03-01 NOTE — ED PROVIDER NOTES
[facility]  ED  Emergency Department Encounter  Emergency Medicine Resident     Pt Name:Mohsen Shi  MRN: [id number]  [place] 1980  Date of evaluation: 2/28/23  PCP:  LACIE Bennett  Note Started: 10:15 PM EST      CHIEF COMPLAINT       Chief Complaint   Patient presents with    Exposure to STD       HISTORY OF PRESENT ILLNESS  (Location/Symptom, Timing/Onset, Context/Setting, Quality, Duration, Modifying Factors, Severity.)      Nikia Ruiz is a 38 y.o. male who presents with recurrent symptoms of discharge and burning with urination following treatment in December for UTI/STI. He states that he is continue to have unprotected sex with his partner, and he is concerned that he did not take his medications correctly. Prior results for UTI and STI were unremarkable. Notably the only positive result was a small leukocyte esterase present on his urinalysis. Patient states they have a lesion on their penis  States they were tested for herpes by PCP - negative    PAST MEDICAL / SURGICAL / SOCIAL / FAMILY HISTORY      has a past medical history of Diabetes mellitus ([address] and Hypertension. has no past surgical history on file.       Social History     Socioeconomic History    Marital status: Single     Spouse name: Not on file    Number of children: Not on file    Years of education: Not on file    Highest education level: Not on file   Occupational History    Not on file   Tobacco Use    Smoking status: Every Day     Types: Cigarettes    Smokeless tobacco: Current   Substance and Sexual Activity    Alcohol use: Not on file    Drug use: Yes     Types: Marijuana Surinder Kevin)    Sexual activity: Not on file   Other Topics Concern    Not on file   Social History Narrative    Not on file     Social Determinants of Health     Financial Resource Strain: Not on file   Food Insecurity: Not on file   Transportation Needs: Not on file   Physical Activity: Not on file   Stress: Not on file Social Connections: Not on file   Intimate Partner Violence: Not on file   Housing Stability: Not on file       No family history on file. Allergies:  Patient has no known allergies. Home Medications:  Prior to Admission medications    Medication Sig Start Date End Date Taking? Authorizing Provider   glyBURIDE (DIABETA) 2.5 MG tablet glyburide 2.5 mg tablet    Historical Provider, MD   insulin glargine (LANTUS SOLOSTAR) 100 UNIT/ML injection pen INJECT 15 UNITS SUBCUTANEOUSLY TWICE DAILY 6/21/22   Historical Provider, MD   metFORMIN (GLUCOPHAGE) 1000 MG tablet metformin 1,000 mg tablet   TAKE 1 TABLET BY MOUTH TWICE DAILY WITH MEALS    Historical Provider, MD   Cholecalciferol 50 MCG (2000 UT) TABS Take 2,000 Units by mouth daily 2/2/22   Historical Provider, MD   atorvastatin (LIPITOR) 40 MG tablet Take 40 mg by mouth daily 2/2/22   Historical Provider, MD   aspirin 81 MG EC tablet aspirin 81 mg tablet,delayed release   TAKE ONE TABLET BY MOUTH ONCE A DAY    Historical Provider, MD   empagliflozin (JARDIANCE) 10 MG tablet Take 10 mg by mouth daily 11/3/22   Historical Provider, MD   lisinopril (PRINIVIL;ZESTRIL) 10 MG tablet Take 10 mg by mouth daily 11/3/22   Historical Provider, MD   acetaminophen (TYLENOL) 500 MG tablet Take 2 tablets by mouth 3 times daily 5/24/22   Rhiannon Roy MD   ibuprofen (IBU) 400 MG tablet Take 1 tablet by mouth every 6 hours as needed for Pain 2/28/22   Isabelle Avila, DO   dextran 70-hypromellose (ARTIFICIAL TEARS) 0.1-0.3 % SOLN opthalmic solution Place 1 drop into both eyes every 4 hours as needed (for eye irritation) 12/20/21   Elmer Delaney, DO   METFORMIN HCL PO Take by mouth    Historical Provider, MD         REVIEW OF SYSTEMS       Review of Systems   Constitutional:  Negative for fever. HENT:  Negative for sore throat. Respiratory:  Negative for chest tightness and shortness of breath. Cardiovascular:  Negative for chest pain.    Gastrointestinal: Negative for diarrhea, nausea and vomiting. Endocrine: Negative for polyuria. Genitourinary:  Positive for dysuria, genital sores (wart) and penile discharge. Negative for decreased urine volume, difficulty urinating, flank pain, frequency, penile pain, penile swelling, scrotal swelling, testicular pain and urgency. Skin:  Negative for rash. Allergic/Immunologic: Negative for environmental allergies and food allergies. Psychiatric/Behavioral:  Negative for self-injury and suicidal ideas. PHYSICAL EXAM      INITIAL VITALS:   /81   Pulse 56   Temp 97.7 °F (36.5 °C) (Oral)   Resp 20   SpO2 100%     Physical Exam  Exam conducted with a chaperone present (Amol Delgado). Constitutional:       Appearance: Normal appearance. HENT:      Head: Normocephalic and atraumatic. Nose: Nose normal.   Eyes:      Extraocular Movements: Extraocular movements intact. Pupils: Pupils are equal, round, and reactive to light. Cardiovascular:      Rate and Rhythm: Normal rate and regular rhythm. Pulmonary:      Effort: Pulmonary effort is normal.      Breath sounds: Normal breath sounds. Abdominal:      General: Abdomen is flat. Palpations: Abdomen is soft. Genitourinary:     Penis: Circumcised. Lesions present. No tenderness, discharge or swelling. Anupam stage (genital): 5. Comments: Condyloma present on dorsal aspect of penis, appears to be well demarcated with no erythema  Neurological:      Mental Status: He is alert. DDX/DIAGNOSTIC RESULTS / EMERGENCY DEPARTMENT COURSE / MDM     Medical Decision Making  This is a 56-year-old male who presents to the emergency department due to the dysuria, discharge from his penis, and concerns for STDs/UTI. He was recently treated for for chlamydia gonorrhea in December and was given Rocephin and doxycycline but he is unsure if he took his medications appropriately. He states that he also has a lesion on his penis.   He has been recently tested for gonorrhea and chlamydia, as well as HIV, and syphilis, all of which have come back negative. On his last visit he was positive with leukocyte esterase, however no antibiotics for UTI were given. On physical examination a well demarcated lesion is seen on the tip of the penis, however no discharge is noted. Current plan is to test for trichomonas, chlamydia, gonorrhea, and urinalysis. Patient states that they have been tested for herpes at their primary care physician, and result was negative. Will not be giving prophylaxis for GC per discussion with Ector Sadler, however I have told the patient that we would like him to stay until the urinalysis and trichomonas results return. Amount and/or Complexity of Data Reviewed  External Data Reviewed: labs and notes. Details: Seen for STD exposire in December, given doxy and rocephin for prophylaxis  Prior visit for stepping on nails, given tylenol, cipro and doxy      Neagtive for Chlamydia. Gonorrhea, Syphillis, but trace leuks present on ua. Labs:  Decision-making details documented in ED Course. Radiology:  Decision-making details documented in ED Course. ECG/medicine tests:  Decision-making details documented in ED Course. Risk  OTC drugs. Prescription drug management.     Critical Care  Total time providing critical care: < 30 minutes      EKG  none    All EKG's are interpreted by the Emergency Department Physician who either signs or Co-signs this chart in the absence of a cardiologist.    EMERGENCY DEPARTMENT COURSE:      ED Course as of 03/01/23 0220   Tue Feb 28, 2023   2247 Vitals normal, will move patient to waiting room for Urinalysis and trich [MZ]   Wed Mar 01, 2023   0021 Leukocyte Esterase, Urine(!): SMALL [MZ]   0040 WBC, UA: TOO NUMEROUS TO COUNT [MZ]      ED Course User Index  [MZ] Sisi Tinoco MD   Patient discharged with follow-up instuctions for further treatment should any results return positive    PROCEDURES:  none    CONSULTS:  None    CRITICAL CARE:  There was significant risk of life threatening deterioration of patient's condition requiring my direct management. Critical care time 0 minutes, excluding any documented procedures. FINAL IMPRESSION      1.  Acute cystitis without hematuria          DISPOSITION / PLAN     DISPOSITION Decision To Discharge 03/01/2023 01:51:21 AM      PATIENT REFERRED TO:  Ashley Mo, LACIE  [id number]  [phone]    In 3 days  For suture removal    LACIE Fuentes  [address] [place] [facility]  [phone]    Schedule an appointment as soon as possible for a visit in 2 days  If symptoms worsen    [address] [address] [place]   [phone]    As needed    DISCHARGE MEDICATIONS:  Discharge Medication List as of 3/1/2023  1:51 AM          Dotty Gomes MD  Emergency Medicine Resident    (Please note that portions of thisnote were completed with a voice recognition program.  Efforts were made to edit the dictations but occasionally words are mis-transcribed.)       Dotty Gomes MD  Resident  03/01/23 0012

## 2023-03-01 NOTE — ED PROVIDER NOTES
[facility]  ED  Emergency Department Encounter  Emergency Medicine Resident     Pt Name:Mohsen Leon  MRN: [id number]  [place] 1980  Date of evaluation: 3/1/23  PCP:  LACIE Cuevas  Note Started: 5:39 PM EST      CHIEF COMPLAINT       Chief Complaint   Patient presents with    Exposure to STD       HISTORY OF PRESENT ILLNESS  (Location/Symptom, Timing/Onset, Context/Setting, Quality, Duration, Modifying Factors, Severity.)      Nino Escalera is a 38 y.o. male who presents with a positive result for trichomoniasis. Patient was seen yesterday in this emergency department, was tested for trichomoniasis, gonorrhea, chlamydia and had a urinalysis. Patient's trichomoniasis came back positive, rest of the test came back negative. Patient is returning to the emergency department requesting treatment. Patient denies any new symptoms. Patient states she is still having dysuria, penile discharge. Denies any fever, chills, nausea, vomiting, chest pain, shortness of breath or abdominal pain. PAST MEDICAL / SURGICAL / SOCIAL / FAMILY HISTORY      has a past medical history of Diabetes mellitus ([address] and Hypertension. has no past surgical history on file.       Social History     Socioeconomic History    Marital status: Single     Spouse name: Not on file    Number of children: Not on file    Years of education: Not on file    Highest education level: Not on file   Occupational History    Not on file   Tobacco Use    Smoking status: Every Day     Types: Cigarettes    Smokeless tobacco: Current   Substance and Sexual Activity    Alcohol use: Not on file    Drug use: Yes     Types: Marijuana Kendrickchico Swartz)    Sexual activity: Not on file   Other Topics Concern    Not on file   Social History Narrative    Not on file     Social Determinants of Health     Financial Resource Strain: Not on file   Food Insecurity: Not on file   Transportation Needs: Not on file   Physical Activity: Not on file Stress: Not on file   Social Connections: Not on file   Intimate Partner Violence: Not on file   Housing Stability: Not on file       History reviewed. No pertinent family history. Allergies:  Patient has no known allergies. Home Medications:  Prior to Admission medications    Medication Sig Start Date End Date Taking? Authorizing Provider   glyBURIDE (DIABETA) 2.5 MG tablet glyburide 2.5 mg tablet    Historical Provider, MD   insulin glargine (LANTUS SOLOSTAR) 100 UNIT/ML injection pen INJECT 15 UNITS SUBCUTANEOUSLY TWICE DAILY 6/21/22   Historical Provider, MD   metFORMIN (GLUCOPHAGE) 1000 MG tablet metformin 1,000 mg tablet   TAKE 1 TABLET BY MOUTH TWICE DAILY WITH MEALS    Historical Provider, MD   Cholecalciferol 50 MCG (2000 UT) TABS Take 2,000 Units by mouth daily 2/2/22   Historical Provider, MD   atorvastatin (LIPITOR) 40 MG tablet Take 40 mg by mouth daily 2/2/22   Historical Provider, MD   aspirin 81 MG EC tablet aspirin 81 mg tablet,delayed release   TAKE ONE TABLET BY MOUTH ONCE A DAY    Historical Provider, MD   empagliflozin (JARDIANCE) 10 MG tablet Take 10 mg by mouth daily 11/3/22   Historical Provider, MD   lisinopril (PRINIVIL;ZESTRIL) 10 MG tablet Take 10 mg by mouth daily 11/3/22   Historical Provider, MD   acetaminophen (TYLENOL) 500 MG tablet Take 2 tablets by mouth 3 times daily 5/24/22   Robert Perry MD   ibuprofen (IBU) 400 MG tablet Take 1 tablet by mouth every 6 hours as needed for Pain 2/28/22   Dana Schmitt, DO   dextran 70-hypromellose (ARTIFICIAL TEARS) 0.1-0.3 % SOLN opthalmic solution Place 1 drop into both eyes every 4 hours as needed (for eye irritation) 12/20/21   Elmer Hussein, DO   METFORMIN HCL PO Take by mouth    Historical Provider, MD         REVIEW OF SYSTEMS       Review of Systems   Constitutional:  Negative for activity change, appetite change, chills, fatigue and fever. HENT:  Negative for congestion, sore throat and trouble swallowing. Respiratory:  Negative for chest tightness, shortness of breath and wheezing. Cardiovascular:  Negative for chest pain. Gastrointestinal:  Negative for abdominal pain, nausea and vomiting. Genitourinary:  Positive for dysuria and penile discharge. Negative for penile pain, penile swelling and scrotal swelling. Musculoskeletal:  Negative for back pain. PHYSICAL EXAM      INITIAL VITALS:   BP (!) 169/94   Pulse 76   Resp 15   SpO2 99%     Physical Exam  Constitutional:       General: He is not in acute distress. Appearance: Normal appearance. He is not ill-appearing or toxic-appearing. HENT:      Head: Normocephalic and atraumatic. Eyes:      Extraocular Movements: Extraocular movements intact. Pupils: Pupils are equal, round, and reactive to light. Cardiovascular:      Rate and Rhythm: Normal rate and regular rhythm. Pulmonary:      Effort: Pulmonary effort is normal.      Breath sounds: Normal breath sounds. Abdominal:      General: Abdomen is flat. Palpations: Abdomen is soft. Tenderness: There is no abdominal tenderness. Genitourinary:     Comments: Patient has a raised lesion seen on the anterior aspect of his penis, patient states that this lesion has been there for quite some time, no tenderness or erythema  No scrotal swelling or penile swelling appreciated  No penile discharge appreciated  Neurological:      General: No focal deficit present. Mental Status: He is alert and oriented to person, place, and time. Mental status is at baseline. DDX/DIAGNOSTIC RESULTS / EMERGENCY DEPARTMENT COURSE / MDM     Medical Decision Making  78-year-old male presents emergency department with positive trichomoniasis test.  Patient was seen yesterday, swab for trichomoniasis, gonorrhea and chlamydia. Patient's gonorrhea and chlamydia came back negative, trichomoniasis came back positive.   Patient is returning to the emergency department requesting treatment at this time.  As the patient had work-up done yesterday, no further work-up is needed today. We will give the patient 1 dose of metronidazole in the emergency department. At this time the patient will be discharged with strict return precautions. Risk  Prescription drug management. EKG      All EKG's are interpreted by the Emergency Department Physician who either signs or Co-signs this chart in the absence of a cardiologist.    EMERGENCY DEPARTMENT COURSE:           PROCEDURES:      CONSULTS:  None    CRITICAL CARE:  There was significant risk of life threatening deterioration of patient's condition requiring my direct management. Critical care time 0 minutes, excluding any documented procedures. FINAL IMPRESSION      1.  Trichimoniasis          DISPOSITION / PLAN     DISPOSITION Decision To Discharge 03/01/2023 06:38:36 PM      PATIENT REFERRED TO:  LACIE Villalobos  [address] [facility]  [phone]    Schedule an appointment as soon as possible for a visit       [facility] ED  [address] [id number] [phone]  Go to   If symptoms worsen    DISCHARGE MEDICATIONS:  New Prescriptions    No medications on file       Cynthia Ramirez MD  Emergency Medicine Resident    (Please note that portions of thisnote were completed with a voice recognition program.  Efforts were made to edit the dictations but occasionally words are mis-transcribed.)       Cynthia Ramirez MD  Resident  03/01/23 3221

## 2023-03-01 NOTE — DISCHARGE INSTRUCTIONS
Seen in the emergency department for trichomoniasis. We gave you a dose of metronidazole in the emergency department which should clear your infection. Please return to the emergency department if you experience any new or worsening symptoms including high fever, increased pain or swelling in your genitals, increase pain with urination, increasing or penile discharge, increasing abdominal pain or any other concerning symptoms. Please follow-up with your primary care provider within a few days of discharge.

## 2023-03-01 NOTE — ED NOTES
The following labs were labeled with appropriate pt sticker and tubed to lab:     [] Blue     [] Lavender   [] on ice  [] Green/yellow  [] Green/black [] on ice  [] Dorsie Betters  [] on ice  [] Yellow  [] Red  [] Type/ Screen  [] ABG  [] VBG    [] COVID-19 swab    [] Rapid  [] PCR  [] Flu swab  [] Peds Viral Panel     [x] Urine Sample  [] Fecal Sample  [] Pelvic Cultures  [] Blood Cultures  [] X 2  [] STREP Cultures       hSeeba Bernardo RN  02/28/23 0232

## 2023-03-01 NOTE — ED PROVIDER NOTES
[facility] ED  eMERGENCY dEPARTMENT eNCOUnter   Attending Attestation     Pt Name: Andrews Weber  MRN: [id number]  [place] 1980  Date of evaluation: 2/28/23       Andrews Weber is a 38 y.o. male who presents with Exposure to STD      History: Presents with exposure to STD. Patient is concerned because he has discharge and burning. 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 Exam: Heart rate and rhythm are regular. Lungs are clear to auscultation bilaterally. Abdomen is soft, nontender. Patient is awake alert and acting properly. Patient deferred genital exam for myself. I did not see the genital wart. Patient states that has been there for many months to a year. Patient's been worked up for this. Plan for STD testing and discharge. Patient was treated empirically last time but had negative testing and presents today with similar symptoms. Concerned that he may have trichomonas given the fact that he did not have this test done last time. Trichomonas will not come back today. We will wait for results and will treat based on results. I performed a history and physical examination of the patient and discussed management with the resident. I reviewed the residents note and agree with the documented findings and plan of care. Any areas of disagreement are noted on the chart. I was personally present for the key portions of any procedures. I have documented in the chart those procedures where I was not present during the key portions. I have personally reviewed all images and agree with the resident's interpretation. I have reviewed the emergency nurses triage note. I agree with the chief complaint, past medical history, past surgical history, allergies, medications, social and family history as documented unless otherwise noted below.  Documentation of the HPI, Physical Exam and Medical Decision Making performed by medical students or scribes is based on my personal performance of the HPI, PE and MDM. For Phys Assistant/ Nurse Practitioner cases/documentation I have had a face to face evaluation of this patient and have completed at least one if not all key elements of the E/M (history, physical exam, and MDM). Additional findings are as noted. For APC cases I have personally evaluated and examined the patient in conjunction with the APC and agree with the treatment plan and disposition of the patient as recorded by the APC.     Chelsey Church MD  Attending Emergency  Physician       Keith Hawk MD  02/28/23 2642

## 2023-03-01 NOTE — ED NOTES
Pt to ER for c/o exposure to STD. Pt states that he was seen and tested for the STD panels a couple days ago and tested positive for one of them. Pt states he has been having dysuria and discharge from his penis. Pt states he was unsure if this was from the STD or if he had a UTI because he also had tested positive for a UTI prior to this as well. No acute distress noted, RR even and NL.       Angela Carr RN  03/01/23 6391

## 2023-03-01 NOTE — ED PROVIDER NOTES
[facility]   Emergency Department  Emergency Medicine Attending Sign-out     Care of Pretty Kim was assumed from previous attending Dr. Cole Munguia and is being seen for Exposure to STD  . The patient's initial evaluation and plan have been discussed with the prior provider who initially evaluated the patient. Attestation  I was available and discussed any additional care issues that arose and coordinated the management plans with the resident(s) caring for the patient during my duty period. Any areas of disagreement with resident's documentation of care or procedures are noted on the chart. I was personally present for the key portions of any/all procedures, during my duty period. I have documented in the chart those procedures where I was not present during the key portions. BRIEF PATIENT SUMMARY/MDM COURSE PER INITIAL PROVIDER:   RECENT VITALS:     Temp: 97.7 °F (36.5 °C),  Heart Rate: 56, Resp: 20, BP: 130/81, SpO2: 100 %    This patient is a 38 y.o. Male with concern for STD exposure. GC and trichomonas as well as UA. DIAGNOSTICS/MEDICATIONS:     MEDICATIONS GIVEN:  ED Medication Orders (From admission, onward)      None            LABS    Labs Reviewed   URINALYSIS WITH REFLEX TO CULTURE - Abnormal; Notable for the following components:       Result Value    Glucose, Ur 3+ (*)     Leukocyte Esterase, Urine SMALL (*)     All other components within normal limits   C.TRACHOMATIS N.GONORRHOEAE DNA, URINE   CULTURE, URINE   TRICHOMONAS VAGINALI, MOLECULAR   MICROSCOPIC URINALYSIS       RADIOLOGY  No results found. OUTSTANDING TASKS / ADDITIONAL COMMENTS   Urine studies. Urinalysis did show too numerous to count WBCs but no bacteria. Had trace leukocyte esterase. This is identical to his urine sample that he had done in December at which time urine culture was negative. Patient does not have any reported genitourinary abnormalities. Less likely to be a UTI.   Urine culture, Pt discharged to home with all belongings, copy of AVS, and a  copy of discharge check list.  Pt left unit ambulatory and stated he was leaving hospital ground by private auto, driving himself.    Electronically signed by Shruti Mccormick RN on 8/3/2019 at 12:00 PM follow-up GC and trichomonas on my Chart    Miley Walters MD  Emergency Medicine Attending  Kennedy Ryder MD  03/01/23 [address] 285, MD  03/01/23 6278       Landy Ryder MD  03/01/23 2041

## 2023-08-30 ENCOUNTER — HOSPITAL ENCOUNTER (EMERGENCY)
Age: 43
Discharge: HOME | End: 2023-08-30
Attending: EMERGENCY MEDICINE
Payer: COMMERCIAL

## 2023-08-30 VITALS
WEIGHT: 165.34 LBS | DIASTOLIC BLOOD PRESSURE: 94 MMHG | HEIGHT: 69 IN | RESPIRATION RATE: 16 BRPM | HEART RATE: 80 BPM | OXYGEN SATURATION: 98 % | SYSTOLIC BLOOD PRESSURE: 153 MMHG | BODY MASS INDEX: 24.49 KG/M2 | TEMPERATURE: 97.4 F

## 2023-08-30 DIAGNOSIS — R30.0 DYSURIA: Primary | ICD-10-CM

## 2023-08-30 LAB
BILIRUB UR QL STRIP: NEGATIVE
CLARITY UR: CLEAR
COLOR UR: YELLOW
COMMENT: ABNORMAL
GLUCOSE UR STRIP-MCNC: ABNORMAL MG/DL
HGB UR QL STRIP.AUTO: NEGATIVE
KETONES UR STRIP-MCNC: NEGATIVE MG/DL
LEUKOCYTE ESTERASE UR QL STRIP: NEGATIVE
NITRITE UR QL STRIP: NEGATIVE
PH UR STRIP: 6.5 [PH] (ref 5–8)
PROT UR STRIP-MCNC: NEGATIVE MG/DL
SOURCE: NORMAL
SP GR UR STRIP: 1.01 (ref 1–1.03)
TRICHOMONAS VAGINALI, MOLECULAR: NEGATIVE
UROBILINOGEN UR STRIP-ACNC: NORMAL EU/DL (ref 0–1)

## 2023-08-30 PROCEDURE — 87491 CHLMYD TRACH DNA AMP PROBE: CPT

## 2023-08-30 PROCEDURE — 87661 TRICHOMONAS VAGINALIS AMPLIF: CPT

## 2023-08-30 PROCEDURE — 99283 EMERGENCY DEPT VISIT LOW MDM: CPT

## 2023-08-30 PROCEDURE — 87591 N.GONORRHOEAE DNA AMP PROB: CPT

## 2023-08-30 PROCEDURE — 81003 URINALYSIS AUTO W/O SCOPE: CPT

## 2023-08-30 NOTE — ED NOTES
Pt arrived to ED via triage needing an STD check. Pt states he thinks he may have a UTI but wants to get checked for an STD. Per pt, he has had tingling with urination. States he has had unprotected sex. RR even and unlabored, A+O x 4, ambulatory, call light in reach.       Meghann Whittaker RN  08/30/23 8119

## 2023-08-30 NOTE — ED NOTES
The following labs were labeled with appropriate pt sticker and tubed to lab:     [] Blue     [] Lavender   [] on ice  [] Green/yellow  [] Green/black [] on ice  [] Delwyn Nely  [] on ice  [] Yellow  [] Red  [] Type/ Screen  [] ABG  [] VBG    [] COVID-19 swab    [] Rapid  [] PCR  [] Flu swab  [] Peds Viral Panel     [x] Urine Sample  [] Fecal Sample  [] Pelvic Cultures  [] Blood Cultures  [] X 2  [] STREP Cultures       Alayna Aleman RN  08/30/23 0176

## 2023-08-30 NOTE — ED PROVIDER NOTES
[facility] ED  Emergency Department Encounter  Emergency Medicine Resident     Pt Name:Mohsen Liang  MRN: [id number]  [address] [facility] 1980  Date of evaluation: 8/30/23  PCP:  LACIE Roca  8:48 AM EDT      CHIEF COMPLAINT       Chief Complaint   Patient presents with    Exposure to STD     STD check. Possible UTI       HISTORY OF PRESENT ILLNESS  (Location/Symptom, Timing/Onset, Context/Setting, Quality, Duration, Modifying Factors, Severity.)      Rose Saleh is a 37 y.o. male who presents with pain, and dysuria. Patient does report he is sexually active with multiple partners, no drainage or discharge from his penis, he does report a previous history of UTIs. As well as trichomonas in the past.  No abdominal pain, no fevers or chills no nausea, or vomiting. PAST MEDICAL / SURGICAL / SOCIAL / FAMILY HISTORY      has a past medical history of Diabetes mellitus ([address]) and Hypertension. has no past surgical history on file. Social History     Socioeconomic History    Marital status: Single     Spouse name: Not on file    Number of children: Not on file    Years of education: Not on file    Highest education level: Not on file   Occupational History    Not on file   Tobacco Use    Smoking status: Every Day     Types: Cigarettes    Smokeless tobacco: Current   Substance and Sexual Activity    Alcohol use: Not on file    Drug use: Yes     Types: Marijuana Yajaira Rival)    Sexual activity: Not on file   Other Topics Concern    Not on file   Social History Narrative    Not on file     Social Determinants of Health     Financial Resource Strain: Not on file   Food Insecurity: Not on file   Transportation Needs: Not on file   Physical Activity: Not on file   Stress: Not on file   Social Connections: Not on file   Intimate Partner Violence: Not on file   Housing Stability: Not on file       History reviewed. No pertinent family history.     Allergies:  Patient has no known thisnote were completed with a voice recognition program.  Efforts were made to edit the dictations but occasionally words are mis-transcribed.)        Zoya Gonzalez,   08/30/23 1145

## 2023-08-30 NOTE — DISCHARGE INSTRUCTIONS
Please follow-up the results of your testing on MyChart, return to ER for any positive results or worsening symptoms.

## 2023-08-31 LAB
CHLAMYDIA DNA UR QL NAA+PROBE: NEGATIVE
N GONORRHOEA DNA UR QL NAA+PROBE: NEGATIVE
SPECIMEN DESCRIPTION: NORMAL

## 2024-01-13 ENCOUNTER — HOSPITAL ENCOUNTER (EMERGENCY)
Age: 44
Discharge: HOME | End: 2024-01-13
Attending: EMERGENCY MEDICINE
Payer: COMMERCIAL

## 2024-01-13 VITALS
BODY MASS INDEX: 24.12 KG/M2 | SYSTOLIC BLOOD PRESSURE: 155 MMHG | RESPIRATION RATE: 18 BRPM | TEMPERATURE: 101.5 F | HEART RATE: 75 BPM | DIASTOLIC BLOOD PRESSURE: 99 MMHG | WEIGHT: 163.36 LBS | OXYGEN SATURATION: 96 %

## 2024-01-13 DIAGNOSIS — K04.7 DENTAL INFECTION: Primary | ICD-10-CM

## 2024-01-13 LAB — GLUCOSE BLD-MCNC: 295 MG/DL (ref 75–110)

## 2024-01-13 PROCEDURE — 6370000000 HC RX 637 (ALT 250 FOR IP): Performed by: STUDENT IN AN ORGANIZED HEALTH CARE EDUCATION/TRAINING PROGRAM

## 2024-01-13 PROCEDURE — 99283 EMERGENCY DEPT VISIT LOW MDM: CPT | Performed by: EMERGENCY MEDICINE

## 2024-01-13 PROCEDURE — 82947 ASSAY GLUCOSE BLOOD QUANT: CPT

## 2024-01-13 RX ORDER — ACETAMINOPHEN 500 MG
1000 TABLET ORAL
Status: COMPLETED | OUTPATIENT
Start: 2024-01-13 | End: 2024-01-13

## 2024-01-13 RX ORDER — PENICILLIN V POTASSIUM 250 MG/1
500 TABLET ORAL ONCE
Status: COMPLETED | OUTPATIENT
Start: 2024-01-13 | End: 2024-01-13

## 2024-01-13 RX ORDER — PENICILLIN V POTASSIUM 500 MG/1
500 TABLET ORAL 4 TIMES DAILY
Qty: 28 TABLET | Refills: 0 | Status: SHIPPED | OUTPATIENT
Start: 2024-01-13 | End: 2024-01-20

## 2024-01-13 RX ORDER — ACETAMINOPHEN 500 MG
1000 TABLET ORAL EVERY 8 HOURS PRN
Qty: 120 TABLET | Refills: 0 | Status: SHIPPED | OUTPATIENT
Start: 2024-01-13 | End: 2024-02-02

## 2024-01-13 RX ORDER — IBUPROFEN 600 MG/1
600 TABLET ORAL EVERY 8 HOURS PRN
Qty: 120 TABLET | Refills: 0 | Status: SHIPPED | OUTPATIENT
Start: 2024-01-13

## 2024-01-13 RX ADMIN — ACETAMINOPHEN 1000 MG: 500 TABLET ORAL at 20:32

## 2024-01-13 RX ADMIN — PENICILLIN V POTASSIUM 500 MG: 250 TABLET ORAL at 20:50

## 2024-01-13 ASSESSMENT — PAIN DESCRIPTION - PAIN TYPE: TYPE: ACUTE PAIN

## 2024-01-13 ASSESSMENT — PAIN DESCRIPTION - LOCATION: LOCATION: TEETH

## 2024-01-13 ASSESSMENT — PAIN - FUNCTIONAL ASSESSMENT: PAIN_FUNCTIONAL_ASSESSMENT: 0-10

## 2024-01-13 ASSESSMENT — PAIN SCALES - GENERAL: PAINLEVEL_OUTOF10: 6

## 2024-01-14 NOTE — ED NOTES
Patient presents to the ED with c/o dental pain. Patient concerned for a dental infection for a lower left sided tooth. Patient states he has felt warm, and believes he has had a fever at home. Patient reports it has been ongoing for a few days. He states he has not been to a dentist in over a year. Patient is alert and oriented x4, answering questions appropriately.

## 2024-01-14 NOTE — DISCHARGE INSTRUCTIONS
Not all dental caries requires antibiotics.  Some conditions call pulpitis require you to see a dentist to provide follow up care which may require either extraction of the tooth or a root canal.     Take your medication as indicated and prescribed.  If you are given an antibiotic, then make sure you get the prescription filled and take the antibiotics until finished.  Drink plenty of water while taking the antibiotics.  Avoid drinking alcohol or drinks that have caffeine in it while taking antibiotics.       For pain use ibuprofen (Motrin / Advil) or acetaminophen (Tylenol), unless prescribed medications that have acetaminophen in it.  You can take over the counter acetaminophen tablets (1 - 2 tablets of the 500-mg strength every 6 hours) or ibuprofen tablets (2 tablets every 4 hours).    PLEASE RETURN TO THE EMERGENCY DEPARTMENT IMMEDIATELY for worsening symptoms, swelling to your face, redness on your face, drainage from the tooth, or if you develop any concerning symptoms such as: high fever not relieved by acetaminophen (Tylenol) and/or ibuprofen (Motrin / Advil), chills, shortness of breath, chest pain, feeling of your heart fluttering or racing, persistent nausea and/or vomiting, vomiting up blood, blood in your stool, numbness, loss of consciousness, weakness or tingling in the arms or legs or change in color of the extremities, changes in mental status, persistent headache, blurry vision, loss of bladder / bowel control, unable to follow up with your physician, or other any other care or concern.

## 2024-01-14 NOTE — ED PROVIDER NOTES
I performed a history and physical examination of the patient and discussed management with the resident. I reviewed the resident’s note and agree with the documented findings and plan of care. Any areas of disagreement are noted on the chart. I was personally present for the key portions of any procedures. I have documented in the chart those procedures where I was not present during the key portions. I have reviewed the emergency nurses triage note. I agree with the chief complaint, past medical history, past surgical history, allergies, medications, social and family history as documented unless otherwise noted below. Documentation of the HPI, Physical Exam and Medical Decision Making performed by medical students or scribes is based on my personal performance of the HPI, PE and MDM.   For Phys Assistant/ Nurse Practitioner cases/documentation I have personally evaluated this patient and have completed at least one if not all key elements of the E/M (history, physical exam, and MDM). I find the patient's history and physical exam are consistent with the NP/PA documentation. I agree with the care provided, treatment rendered, disposition and followup plan.   Additional findings are as noted.    Pardeep Rees MD  Attending Emergency  Physician    This patient presented to the emergency department complaints of left lower molar tooth ache for the past several days and worse today.  He reports some fevers and chills.  No vomiting or diarrhea.  No difficulty breathing, speaking, swallowing.  Patient does not currently have a dentist.  Patient is awake and alert, cooperative and responsive.  Speech is fluent and comprehension is normal.  Examination the oral cavity reveals carious and tender left mandibular first molar with some purulent material emanating from the area around the remains of the tooth which is extensively carious.  No swelling or significant lymphadenopathy.  No purulence or fluctuance.  Patient is

## 2024-01-14 NOTE — ED PROVIDER NOTES
[facility] ED  Emergency Department Encounter  Emergency Medicine Resident     Pt Name:Mohsen Arredondo  MRN: [id number]  Birthdate 1980  Date of evaluation: 1/13/24  PCP:  Kyrie Esparza APRN  Note Started: 8:37 PM EST      CHIEF COMPLAINT       Chief Complaint   Patient presents with    Dental Pain       HISTORY OF PRESENT ILLNESS  (Location/Symptom, Timing/Onset, Context/Setting, Quality, Duration, Modifying Factors, Severity.)      Mohsen Arredondo is a 43 y.o. male who presents with dental pain for the past 2 to 3 days.  Patient reports that the pain was worse yesterday until he felt a release of the pressure with some possible drainage of fluid.  He reports that he has been spitting out some fluid as well but is unsure if it is an abscess that is draining.  He reports that he has been able to eat and drink without any difficulty and denies difficulty swallowing or difficulty breathing.  He denies neck pain.  He reports perceptive fevers and chills.  He reports he is a diabetic and has been taking his medications.  He reports that he took some Motrin around 6 hours ago.  He denies any sore throat, runny nose, nausea or vomiting.  He does report some generalized abdominal discomfort but believes this is due to him not having significant amount to eat recently.  Patient does not have a dentist currently.    PAST MEDICAL / SURGICAL / SOCIAL / FAMILY HISTORY      has a past medical history of Diabetes mellitus (HCC) and Hypertension.     has no past surgical history on file.    Social History     Socioeconomic History    Marital status: Single     Spouse name: Not on file    Number of children: Not on file    Years of education: Not on file    Highest education level: Not on file   Occupational History    Not on file   Tobacco Use    Smoking status: Every Day     Types: Cigarettes    Smokeless tobacco: Current   Substance and Sexual Activity    Alcohol use: Not on file    Drug use: Yes

## 2024-01-14 NOTE — PROGRESS NOTES
This patient was never interviewed nor examined by me. I did not participate in the care of this patient.

## 2024-07-27 ENCOUNTER — HOSPITAL ENCOUNTER (EMERGENCY)
Age: 44
Discharge: HOME | End: 2024-07-27
Attending: EMERGENCY MEDICINE
Payer: COMMERCIAL

## 2024-07-27 VITALS
RESPIRATION RATE: 16 BRPM | DIASTOLIC BLOOD PRESSURE: 81 MMHG | TEMPERATURE: 97.9 F | BODY MASS INDEX: 22.15 KG/M2 | OXYGEN SATURATION: 99 % | SYSTOLIC BLOOD PRESSURE: 123 MMHG | WEIGHT: 150 LBS | HEART RATE: 72 BPM

## 2024-07-27 DIAGNOSIS — Z20.2 POSSIBLE EXPOSURE TO STD: ICD-10-CM

## 2024-07-27 DIAGNOSIS — R36.9 PENILE DISCHARGE: Primary | ICD-10-CM

## 2024-07-27 LAB
BACTERIA URNS QL MICRO: ABNORMAL
BILIRUB UR QL STRIP: NEGATIVE
CASTS #/AREA URNS LPF: ABNORMAL /LPF (ref 0–8)
CLARITY UR: CLEAR
COLOR UR: YELLOW
EPI CELLS #/AREA URNS HPF: ABNORMAL /HPF (ref 0–5)
GLUCOSE UR STRIP-MCNC: ABNORMAL MG/DL
HGB UR QL STRIP.AUTO: NEGATIVE
HIV 1+2 AB+HIV1 P24 AG SERPL QL IA: NONREACTIVE
KETONES UR STRIP-MCNC: NEGATIVE MG/DL
LEUKOCYTE ESTERASE UR QL STRIP: ABNORMAL
NITRITE UR QL STRIP: NEGATIVE
PH UR STRIP: 6 [PH] (ref 5–8)
PROT UR STRIP-MCNC: NEGATIVE MG/DL
RBC #/AREA URNS HPF: ABNORMAL /HPF (ref 0–4)
SP GR UR STRIP: 1.02 (ref 1–1.03)
T PALLIDUM AB SER QL IA: NONREACTIVE
UROBILINOGEN UR STRIP-ACNC: NORMAL EU/DL (ref 0–1)
WBC #/AREA URNS HPF: ABNORMAL /HPF (ref 0–5)

## 2024-07-27 PROCEDURE — 6360000002 HC RX W HCPCS

## 2024-07-27 PROCEDURE — 86780 TREPONEMA PALLIDUM: CPT

## 2024-07-27 PROCEDURE — 87077 CULTURE AEROBIC IDENTIFY: CPT

## 2024-07-27 PROCEDURE — 87491 CHLMYD TRACH DNA AMP PROBE: CPT

## 2024-07-27 PROCEDURE — 6370000000 HC RX 637 (ALT 250 FOR IP)

## 2024-07-27 PROCEDURE — 81001 URINALYSIS AUTO W/SCOPE: CPT

## 2024-07-27 PROCEDURE — 87389 HIV-1 AG W/HIV-1&-2 AB AG IA: CPT

## 2024-07-27 PROCEDURE — 87661 TRICHOMONAS VAGINALIS AMPLIF: CPT

## 2024-07-27 PROCEDURE — 96372 THER/PROPH/DIAG INJ SC/IM: CPT

## 2024-07-27 PROCEDURE — 99284 EMERGENCY DEPT VISIT MOD MDM: CPT

## 2024-07-27 PROCEDURE — 87591 N.GONORRHOEAE DNA AMP PROB: CPT

## 2024-07-27 PROCEDURE — 87086 URINE CULTURE/COLONY COUNT: CPT

## 2024-07-27 RX ORDER — DOXYCYCLINE HYCLATE 100 MG
100 TABLET ORAL ONCE
Status: COMPLETED | OUTPATIENT
Start: 2024-07-27 | End: 2024-07-27

## 2024-07-27 RX ORDER — METRONIDAZOLE 500 MG/1
500 TABLET ORAL ONCE
Status: COMPLETED | OUTPATIENT
Start: 2024-07-27 | End: 2024-07-27

## 2024-07-27 RX ORDER — DOXYCYCLINE HYCLATE 100 MG
100 TABLET ORAL 2 TIMES DAILY
Qty: 14 TABLET | Refills: 0 | Status: SHIPPED | OUTPATIENT
Start: 2024-07-27 | End: 2024-08-03

## 2024-07-27 RX ORDER — METRONIDAZOLE 500 MG/1
500 TABLET ORAL 2 TIMES DAILY
Qty: 14 TABLET | Refills: 0 | Status: SHIPPED | OUTPATIENT
Start: 2024-07-27 | End: 2024-08-03

## 2024-07-27 RX ORDER — CEFTRIAXONE 500 MG/1
500 INJECTION, POWDER, FOR SOLUTION INTRAMUSCULAR; INTRAVENOUS ONCE
Status: COMPLETED | OUTPATIENT
Start: 2024-07-27 | End: 2024-07-27

## 2024-07-27 RX ADMIN — DOXYCYCLINE HYCLATE 100 MG: 100 TABLET, COATED ORAL at 17:07

## 2024-07-27 RX ADMIN — CEFTRIAXONE SODIUM 500 MG: 500 INJECTION, POWDER, FOR SOLUTION INTRAMUSCULAR; INTRAVENOUS at 17:07

## 2024-07-27 RX ADMIN — METRONIDAZOLE 500 MG: 500 TABLET ORAL at 17:07

## 2024-07-27 ASSESSMENT — LIFESTYLE VARIABLES: HOW OFTEN DO YOU HAVE A DRINK CONTAINING ALCOHOL: NEVER

## 2024-07-27 ASSESSMENT — ENCOUNTER SYMPTOMS
VOMITING: 0
ABDOMINAL PAIN: 0
NAUSEA: 0

## 2024-07-27 ASSESSMENT — PAIN - FUNCTIONAL ASSESSMENT: PAIN_FUNCTIONAL_ASSESSMENT: NONE - DENIES PAIN

## 2024-07-27 NOTE — ED PROVIDER NOTES
[facility] ED  eMERGENCY dEPARTMENT eNCOUnter   Attending Attestation     Pt Name: Mohsen Arredondo  MRN: [id number]  Birthdate 1980  Date of evaluation: 7/27/24       Mohsen Arredondo is a 44 y.o. male who presents with Exposure to STD      4:29 PM EDT          I performed a history and physical examination of the patient and discussed management with the resident. I reviewed the resident’s note and agree with the documented findings and plan of care. Any areas of disagreement are noted on the chart. I was personally present for the key portions of any procedures. I have documented in the chart those procedures where I was not present during the key portions. I have personally reviewed all images and agree with the resident's interpretation. I have reviewed the emergency nurses triage note. I agree with the chief complaint, past medical history, past surgical history, allergies, medications, social and family history as documented unless otherwise noted below. Documentation of the HPI, Physical Exam and Medical Decision Making performed by medical students or scribes is based on my personal performance of the HPI, PE and MDM. For Phys Assistant/ Nurse Practitioner cases/documentation I have had a face to face evaluation of this patient and have completed at least one if not all key elements of the E/M (history, physical exam, and MDM). Additional findings are as noted.    For APC cases I have personally evaluated and examined the patient in conjunction with the APC and agree with the treatment plan and disposition of the patient as recorded by the APC.    Pablo Rivera MD  Attending Emergency  Physician       Pablo Rivera MD  07/27/24 9923

## 2024-07-27 NOTE — DISCHARGE INSTRUCTIONS
You were seen in the emergency department for dysuria and penile discharge your vital signs were stable.  No fever noted.    You tested negative for HIV and syphilis.  Urine was suspicious for STI.    Your gonorrhea and chlamydia results will not come back today.  You may check MyChart in the next 2 to 3 days for your results.  You decided to be treated prophylactically as you are having symptoms.  Please take the doxycycline and Flagyl as prescribed to complete the entire antibiotic course.    Please do not drink alcohol while taking Flagyl as it can have bad side effects.    Please abstain from sexual intercourse for at least 2 weeks to ensure cure.  Please inform your sexual partners so that they may be treated.    Please follow-up with your primary care provider in 1 week given recent ER visit.  Please return the emergency department if your symptoms worsen.

## 2024-07-27 NOTE — ED PROVIDER NOTES
[facility] ED  Emergency Department Encounter  Emergency Medicine Resident     Pt Name:Mohsen Arredondo  MRN: [id number]  Birthdate 1980  Date of evaluation: 7/27/24  PCP:  Kyrie Esparza APRN  Note Started: 3:18 PM EDT      CHIEF COMPLAINT       Chief Complaint   Patient presents with    Exposure to STD       HISTORY OF PRESENT ILLNESS  (Location/Symptom, Timing/Onset, Context/Setting, Quality, Duration, Modifying Factors, Severity.)      Mohsen Arredondo is a 44 y.o. male who presents with dysuria and white penile discharge since yesterday.  Patient states he has had these symptoms in the past and is concerned he may have an \"infection\".  Patient denies any fevers.  No abdominal pain.  No nausea or vomiting.  Denies any hematuria.  He has been having unprotected sex.    PAST MEDICAL / SURGICAL / SOCIAL / FAMILY HISTORY      has a past medical history of Diabetes mellitus (HCC) and Hypertension.     has no past surgical history on file.      Social History     Socioeconomic History    Marital status: Single     Spouse name: Not on file    Number of children: Not on file    Years of education: Not on file    Highest education level: Not on file   Occupational History    Not on file   Tobacco Use    Smoking status: Every Day     Types: Cigarettes    Smokeless tobacco: Current   Substance and Sexual Activity    Alcohol use: Not on file    Drug use: Yes     Types: Marijuana (Weed)    Sexual activity: Not on file   Other Topics Concern    Not on file   Social History Narrative    Not on file     Social Determinants of Health     Financial Resource Strain: Not on file   Food Insecurity: Not on file   Transportation Needs: Not on file   Physical Activity: Not on file   Stress: Not on file   Social Connections: Not on file   Intimate Partner Violence: Not on file   Housing Stability: Not on file       No family history on file.    Allergies:  Patient has no known allergies.    Home Medications:  Prior  tablet Take 1 tablet by mouth 2 times daily for 7 days, Disp-14 tablet, R-0Normal      doxycycline hyclate (VIBRA-TABS) 100 MG tablet Take 1 tablet by mouth 2 times daily for 7 days, Disp-14 tablet, R-0Normal             Berto Collins MD  Emergency Medicine Resident    (Please note that portions of this note were completed with a voice recognition program.  Efforts were made to edit the dictations but occasionally words are mis-transcribed.)

## 2024-07-28 LAB
MICROORGANISM SPEC CULT: NORMAL
SERVICE CMNT-IMP: NORMAL
SPECIMEN DESCRIPTION: NORMAL

## 2024-07-29 LAB
CHLAMYDIA DNA UR QL NAA+PROBE: NEGATIVE
N GONORRHOEA DNA UR QL NAA+PROBE: NEGATIVE
SOURCE: ABNORMAL
SPECIMEN DESCRIPTION: NORMAL
TRICHOMONAS VAGINALI, MOLECULAR: POSITIVE

## 2024-11-26 ENCOUNTER — HOSPITAL ENCOUNTER (EMERGENCY)
Age: 44
Discharge: HOME | End: 2024-11-26
Attending: EMERGENCY MEDICINE
Payer: COMMERCIAL

## 2024-11-26 VITALS
RESPIRATION RATE: 16 BRPM | TEMPERATURE: 98 F | HEART RATE: 57 BPM | SYSTOLIC BLOOD PRESSURE: 151 MMHG | DIASTOLIC BLOOD PRESSURE: 87 MMHG | OXYGEN SATURATION: 100 %

## 2024-11-26 DIAGNOSIS — R36.0 PENILE DISCHARGE, WITHOUT BLOOD: Primary | ICD-10-CM

## 2024-11-26 DIAGNOSIS — Z20.2 POSSIBLE EXPOSURE TO STI: ICD-10-CM

## 2024-11-26 LAB
BACTERIA URNS QL MICRO: NORMAL
BILIRUB UR QL STRIP: NEGATIVE
CASTS #/AREA URNS LPF: NORMAL /LPF (ref 0–8)
CLARITY UR: CLEAR
COLOR UR: YELLOW
EPI CELLS #/AREA URNS HPF: NORMAL /HPF (ref 0–5)
GLUCOSE UR STRIP-MCNC: ABNORMAL MG/DL
HGB UR QL STRIP.AUTO: NEGATIVE
KETONES UR STRIP-MCNC: NEGATIVE MG/DL
LEUKOCYTE ESTERASE UR QL STRIP: ABNORMAL
NITRITE UR QL STRIP: NEGATIVE
PH UR STRIP: 7 [PH] (ref 5–8)
PROT UR STRIP-MCNC: NEGATIVE MG/DL
RBC #/AREA URNS HPF: NORMAL /HPF (ref 0–4)
SP GR UR STRIP: 1.02 (ref 1–1.03)
UROBILINOGEN UR STRIP-ACNC: NORMAL EU/DL (ref 0–1)
WBC #/AREA URNS HPF: NORMAL /HPF (ref 0–5)

## 2024-11-26 PROCEDURE — 6360000002 HC RX W HCPCS

## 2024-11-26 PROCEDURE — 87591 N.GONORRHOEAE DNA AMP PROB: CPT

## 2024-11-26 PROCEDURE — 81001 URINALYSIS AUTO W/SCOPE: CPT

## 2024-11-26 PROCEDURE — 99284 EMERGENCY DEPT VISIT MOD MDM: CPT

## 2024-11-26 PROCEDURE — 87491 CHLMYD TRACH DNA AMP PROBE: CPT

## 2024-11-26 PROCEDURE — 87661 TRICHOMONAS VAGINALIS AMPLIF: CPT

## 2024-11-26 PROCEDURE — 6370000000 HC RX 637 (ALT 250 FOR IP)

## 2024-11-26 PROCEDURE — 87086 URINE CULTURE/COLONY COUNT: CPT

## 2024-11-26 PROCEDURE — 96372 THER/PROPH/DIAG INJ SC/IM: CPT

## 2024-11-26 RX ORDER — METRONIDAZOLE 500 MG/1
500 TABLET ORAL ONCE
Status: COMPLETED | OUTPATIENT
Start: 2024-11-26 | End: 2024-11-26

## 2024-11-26 RX ORDER — DOXYCYCLINE HYCLATE 100 MG
100 TABLET ORAL 2 TIMES DAILY
Qty: 14 TABLET | Refills: 0 | Status: SHIPPED | OUTPATIENT
Start: 2024-11-26 | End: 2024-12-03

## 2024-11-26 RX ORDER — DOXYCYCLINE HYCLATE 100 MG
100 TABLET ORAL ONCE
Status: COMPLETED | OUTPATIENT
Start: 2024-11-26 | End: 2024-11-26

## 2024-11-26 RX ORDER — CEFTRIAXONE 500 MG/1
500 INJECTION, POWDER, FOR SOLUTION INTRAMUSCULAR; INTRAVENOUS ONCE
Status: COMPLETED | OUTPATIENT
Start: 2024-11-26 | End: 2024-11-26

## 2024-11-26 RX ORDER — METRONIDAZOLE 500 MG/1
500 TABLET ORAL 2 TIMES DAILY
Qty: 14 TABLET | Refills: 0 | Status: SHIPPED | OUTPATIENT
Start: 2024-11-26 | End: 2024-12-03

## 2024-11-26 RX ADMIN — DOXYCYCLINE HYCLATE 100 MG: 100 TABLET, COATED ORAL at 22:43

## 2024-11-26 RX ADMIN — CEFTRIAXONE SODIUM 500 MG: 500 INJECTION, POWDER, FOR SOLUTION INTRAMUSCULAR; INTRAVENOUS at 22:45

## 2024-11-26 RX ADMIN — METRONIDAZOLE 500 MG: 500 TABLET ORAL at 22:44

## 2024-11-26 ASSESSMENT — ENCOUNTER SYMPTOMS
NAUSEA: 0
VOMITING: 0
EYE DISCHARGE: 0
RHINORRHEA: 0
COUGH: 0
SORE THROAT: 0
DIARRHEA: 0
SHORTNESS OF BREATH: 0
ABDOMINAL PAIN: 0

## 2024-11-26 ASSESSMENT — PAIN SCALES - GENERAL: PAINLEVEL_OUTOF10: 5

## 2024-11-26 ASSESSMENT — PAIN - FUNCTIONAL ASSESSMENT: PAIN_FUNCTIONAL_ASSESSMENT: 0-10

## 2024-11-26 ASSESSMENT — PAIN DESCRIPTION - LOCATION: LOCATION: PENIS

## 2024-11-26 ASSESSMENT — PAIN DESCRIPTION - DESCRIPTORS: DESCRIPTORS: SORE

## 2024-11-27 LAB
MICROORGANISM SPEC CULT: NO GROWTH
SPECIMEN DESCRIPTION: NORMAL

## 2024-11-27 NOTE — ED PROVIDER NOTES
[facility]     Emergency Department     Faculty Attestation    I performed a history and physical examination of the patient and discussed management with the resident. I reviewed the resident's note and agree with the documented findings and plan of care. Any areas of disagreement are noted on the chart. I was personally present for the key portions of any procedures. I have documented in the chart those procedures where I was not present during the key portions. I have reviewed the emergency nurses triage note. I agree with the chief complaint, past medical history, past surgical history, allergies, medications, social and family history as documented unless otherwise noted below. For Physician Assistant/ Nurse Practitioner cases/documentation I have personally evaluated this patient and have completed at least one if not all key elements of the E/M (history, physical exam, and MDM). Additional findings are as noted.    External genitalia normal except for wart on the dorsum of the penis which is known to the patient.  No testicular pain, shotty bilateral inguinal lymphadenopathy.     Donte Baxter MD  11/26/24 9823

## 2024-11-27 NOTE — ED PROVIDER NOTES
[facility] ED  Emergency Department Encounter  Emergency Medicine Resident     Pt Name:Mohsen Arredondo  MRN: [id number]  Birthdate 1980  Date of evaluation: 11/26/24  PCP:  Kyrie Esparza APRN  Note Started: 10:22 PM EST      CHIEF COMPLAINT       Chief Complaint   Patient presents with    Exposure to STD       HISTORY OF PRESENT ILLNESS  (Location/Symptom, Timing/Onset, Context/Setting, Quality, Duration, Modifying Factors, Severity.)      Mohsen Arredondo is a 44 y.o. male who presents with burning in urine, pain while peeing, white clear discharge for the last 3 to 4 days, he denies fever, nausea, vomiting, abdominal pain, chest pain, shortness of breath.  He does not wear condom or adopt any contraception during sexual practices.  According to him he is sexually active with the same partner, female partner.  He denies signs of infection and her partner.   He reports a presence of wart lesion over the penile shaft for a long time.      PAST MEDICAL / SURGICAL / SOCIAL / FAMILY HISTORY      has a past medical history of Diabetes mellitus (HCC) and Hypertension.       has no past surgical history on file.      Social History     Socioeconomic History    Marital status: Single     Spouse name: Not on file    Number of children: Not on file    Years of education: Not on file    Highest education level: Not on file   Occupational History    Not on file   Tobacco Use    Smoking status: Every Day     Types: Cigarettes    Smokeless tobacco: Current   Substance and Sexual Activity    Alcohol use: Not on file    Drug use: Yes     Types: Marijuana (Weed)    Sexual activity: Not on file   Other Topics Concern    Not on file   Social History Narrative    Not on file     Social Determinants of Health     Financial Resource Strain: High Risk (3/12/2023)    Received from [employer]    Overall Financial Resource Strain (CARDIA)     Difficulty of Paying Living Expenses:

## 2024-11-27 NOTE — DISCHARGE INSTRUCTIONS
You are seen today for penile discharge, burning in urine.    You were tested today for chlamydia, gonorrhea, trichomonas, please follow in MyChart for your results.    You are given antibiotic to cover for possible sexually transmitted infection and trichomonas as you were tested before positive for trichomonas.  Please do not consume alcohol with Flagyl as it can cause disulfiram like reactions.    Schedule appointment with your primary care physician.  You need to see a urologist for possibly removal of the wart lesion from the penile shaft, discuss with your primary care physician    Adopt safe sexual practices, instructions are attached to this discharge summary.  Avoid sexual encounter until resolution of your symptoms and completion of antibiotic.

## 2024-11-27 NOTE — ED NOTES
Patient presents to ED via triage with complaints of penile discharge and painful urination for five days. Patient is concerned for an STD or urinary infection. Patient states he only has one sexual partner. Patient states the last time he was treated for an STD he was unable to finish his course of antibiotics. Patient is A&O x4 and RR even and unlabored. Call light within reach.

## 2024-11-28 LAB
SOURCE: ABNORMAL
TRICHOMONAS VAGINALI, MOLECULAR: POSITIVE
